# Patient Record
Sex: MALE | Race: WHITE | NOT HISPANIC OR LATINO | Employment: OTHER | URBAN - METROPOLITAN AREA
[De-identification: names, ages, dates, MRNs, and addresses within clinical notes are randomized per-mention and may not be internally consistent; named-entity substitution may affect disease eponyms.]

---

## 2018-10-21 ENCOUNTER — HOSPITAL ENCOUNTER (INPATIENT)
Facility: HOSPITAL | Age: 63
LOS: 2 days | Discharge: NURSING, REHAB OR LONG-TERM CARE | DRG: 178 | End: 2018-10-23
Attending: EMERGENCY MEDICINE | Admitting: HOSPITALIST
Payer: MEDICAID

## 2018-10-21 DIAGNOSIS — I10 ESSENTIAL HYPERTENSION: ICD-10-CM

## 2018-10-21 DIAGNOSIS — R50.9 FEVER, UNSPECIFIED FEVER CAUSE: Primary | ICD-10-CM

## 2018-10-21 DIAGNOSIS — R19.7 DIARRHEA, UNSPECIFIED TYPE: ICD-10-CM

## 2018-10-21 DIAGNOSIS — A41.9 SEPSIS, DUE TO UNSPECIFIED ORGANISM: ICD-10-CM

## 2018-10-21 DIAGNOSIS — R11.10 VOMITING: ICD-10-CM

## 2018-10-21 DIAGNOSIS — R00.0 TACHYCARDIA: ICD-10-CM

## 2018-10-21 DIAGNOSIS — J18.9 HCAP (HEALTHCARE-ASSOCIATED PNEUMONIA): ICD-10-CM

## 2018-10-21 DIAGNOSIS — E11.9 TYPE 2 DIABETES MELLITUS WITHOUT COMPLICATION, WITHOUT LONG-TERM CURRENT USE OF INSULIN: ICD-10-CM

## 2018-10-21 DIAGNOSIS — I69.30 HISTORY OF STROKE WITH RESIDUAL DEFICIT: ICD-10-CM

## 2018-10-21 DIAGNOSIS — R11.2 NON-INTRACTABLE VOMITING WITH NAUSEA, UNSPECIFIED VOMITING TYPE: ICD-10-CM

## 2018-10-21 DIAGNOSIS — J69.0 ASPIRATION PNEUMONITIS: ICD-10-CM

## 2018-10-21 DIAGNOSIS — R07.9 CHEST PAIN: ICD-10-CM

## 2018-10-21 PROBLEM — J30.2 SEASONAL ALLERGIES: Status: ACTIVE | Noted: 2018-10-21

## 2018-10-21 LAB
ALBUMIN SERPL BCP-MCNC: 4.3 G/DL
ALP SERPL-CCNC: 94 U/L
ALT SERPL W/O P-5'-P-CCNC: 24 U/L
ANION GAP SERPL CALC-SCNC: 11 MMOL/L
AST SERPL-CCNC: 29 U/L
BASOPHILS # BLD AUTO: 0.02 K/UL
BASOPHILS NFR BLD: 0.2 %
BILIRUB SERPL-MCNC: 0.8 MG/DL
BILIRUB UR QL STRIP: NEGATIVE
BUN SERPL-MCNC: 18 MG/DL
CALCIUM SERPL-MCNC: 10.9 MG/DL
CHLORIDE SERPL-SCNC: 107 MMOL/L
CLARITY UR REFRACT.AUTO: CLEAR
CO2 SERPL-SCNC: 22 MMOL/L
COLOR UR AUTO: YELLOW
CREAT SERPL-MCNC: 1.1 MG/DL
DIFFERENTIAL METHOD: ABNORMAL
EOSINOPHIL # BLD AUTO: 0 K/UL
EOSINOPHIL NFR BLD: 0.3 %
ERYTHROCYTE [DISTWIDTH] IN BLOOD BY AUTOMATED COUNT: 13.7 %
EST. GFR  (AFRICAN AMERICAN): >60 ML/MIN/1.73 M^2
EST. GFR  (NON AFRICAN AMERICAN): >60 ML/MIN/1.73 M^2
GLUCOSE SERPL-MCNC: 136 MG/DL
GLUCOSE UR QL STRIP: NEGATIVE
HCT VFR BLD AUTO: 45.6 %
HGB BLD-MCNC: 15.2 G/DL
HGB UR QL STRIP: NEGATIVE
IMM GRANULOCYTES # BLD AUTO: 0.04 K/UL
IMM GRANULOCYTES NFR BLD AUTO: 0.4 %
INR PPP: 1.2
KETONES UR QL STRIP: NEGATIVE
LACTATE SERPL-SCNC: 1.4 MMOL/L
LACTATE SERPL-SCNC: 1.9 MMOL/L
LEUKOCYTE ESTERASE UR QL STRIP: NEGATIVE
LIPASE SERPL-CCNC: 33 U/L
LYMPHOCYTES # BLD AUTO: 0.3 K/UL
LYMPHOCYTES NFR BLD: 2.9 %
MCH RBC QN AUTO: 30.2 PG
MCHC RBC AUTO-ENTMCNC: 33.3 G/DL
MCV RBC AUTO: 91 FL
MONOCYTES # BLD AUTO: 0.3 K/UL
MONOCYTES NFR BLD: 2.9 %
NEUTROPHILS # BLD AUTO: 9.8 K/UL
NEUTROPHILS NFR BLD: 93.3 %
NITRITE UR QL STRIP: NEGATIVE
NRBC BLD-RTO: 0 /100 WBC
PH UR STRIP: 5 [PH] (ref 5–8)
PLATELET # BLD AUTO: 141 K/UL
PMV BLD AUTO: 10.8 FL
POTASSIUM SERPL-SCNC: 4.3 MMOL/L
PROT SERPL-MCNC: 9.1 G/DL
PROT UR QL STRIP: NEGATIVE
PROTHROMBIN TIME: 11.9 SEC
RBC # BLD AUTO: 5.04 M/UL
SODIUM SERPL-SCNC: 140 MMOL/L
SP GR UR STRIP: 1.02 (ref 1–1.03)
TROPONIN I SERPL DL<=0.01 NG/ML-MCNC: 0.01 NG/ML
TROPONIN I SERPL DL<=0.01 NG/ML-MCNC: <0.006 NG/ML
URN SPEC COLLECT METH UR: NORMAL
UROBILINOGEN UR STRIP-ACNC: 2 EU/DL
WBC # BLD AUTO: 10.51 K/UL

## 2018-10-21 PROCEDURE — 25000003 PHARM REV CODE 250: Performed by: EMERGENCY MEDICINE

## 2018-10-21 PROCEDURE — 85025 COMPLETE CBC W/AUTO DIFF WBC: CPT

## 2018-10-21 PROCEDURE — 99223 1ST HOSP IP/OBS HIGH 75: CPT | Mod: ,,, | Performed by: HOSPITALIST

## 2018-10-21 PROCEDURE — 99285 EMERGENCY DEPT VISIT HI MDM: CPT | Mod: ,,, | Performed by: EMERGENCY MEDICINE

## 2018-10-21 PROCEDURE — 99285 EMERGENCY DEPT VISIT HI MDM: CPT | Mod: 25

## 2018-10-21 PROCEDURE — 84484 ASSAY OF TROPONIN QUANT: CPT

## 2018-10-21 PROCEDURE — 96366 THER/PROPH/DIAG IV INF ADDON: CPT

## 2018-10-21 PROCEDURE — 63600175 PHARM REV CODE 636 W HCPCS: Performed by: EMERGENCY MEDICINE

## 2018-10-21 PROCEDURE — 83605 ASSAY OF LACTIC ACID: CPT

## 2018-10-21 PROCEDURE — 83036 HEMOGLOBIN GLYCOSYLATED A1C: CPT

## 2018-10-21 PROCEDURE — 96365 THER/PROPH/DIAG IV INF INIT: CPT

## 2018-10-21 PROCEDURE — 96367 TX/PROPH/DG ADDL SEQ IV INF: CPT

## 2018-10-21 PROCEDURE — 93005 ELECTROCARDIOGRAM TRACING: CPT

## 2018-10-21 PROCEDURE — 12000002 HC ACUTE/MED SURGE SEMI-PRIVATE ROOM

## 2018-10-21 PROCEDURE — 25000003 PHARM REV CODE 250: Performed by: STUDENT IN AN ORGANIZED HEALTH CARE EDUCATION/TRAINING PROGRAM

## 2018-10-21 PROCEDURE — 83690 ASSAY OF LIPASE: CPT

## 2018-10-21 PROCEDURE — 84443 ASSAY THYROID STIM HORMONE: CPT

## 2018-10-21 PROCEDURE — 96375 TX/PRO/DX INJ NEW DRUG ADDON: CPT

## 2018-10-21 PROCEDURE — 87040 BLOOD CULTURE FOR BACTERIA: CPT

## 2018-10-21 PROCEDURE — 87449 NOS EACH ORGANISM AG IA: CPT

## 2018-10-21 PROCEDURE — 85610 PROTHROMBIN TIME: CPT

## 2018-10-21 PROCEDURE — 93010 ELECTROCARDIOGRAM REPORT: CPT | Mod: ,,, | Performed by: INTERNAL MEDICINE

## 2018-10-21 PROCEDURE — 81003 URINALYSIS AUTO W/O SCOPE: CPT

## 2018-10-21 PROCEDURE — 80053 COMPREHEN METABOLIC PANEL: CPT

## 2018-10-21 PROCEDURE — 25500020 PHARM REV CODE 255: Performed by: EMERGENCY MEDICINE

## 2018-10-21 RX ORDER — ATORVASTATIN CALCIUM 20 MG/1
40 TABLET, FILM COATED ORAL NIGHTLY
Status: DISCONTINUED | OUTPATIENT
Start: 2018-10-21 | End: 2018-10-23 | Stop reason: HOSPADM

## 2018-10-21 RX ORDER — TRAMADOL HYDROCHLORIDE 50 MG/1
50 TABLET ORAL EVERY 6 HOURS PRN
Status: ON HOLD | COMMUNITY
End: 2018-10-23 | Stop reason: HOSPADM

## 2018-10-21 RX ORDER — SODIUM CHLORIDE 0.9 % (FLUSH) 0.9 %
5 SYRINGE (ML) INJECTION
Status: DISCONTINUED | OUTPATIENT
Start: 2018-10-21 | End: 2018-10-23 | Stop reason: HOSPADM

## 2018-10-21 RX ORDER — GLUCAGON 1 MG
1 KIT INJECTION
Status: DISCONTINUED | OUTPATIENT
Start: 2018-10-21 | End: 2018-10-23 | Stop reason: HOSPADM

## 2018-10-21 RX ORDER — ASPIRIN 81 MG/1
81 TABLET ORAL DAILY
COMMUNITY

## 2018-10-21 RX ORDER — VANCOMYCIN HCL IN 5 % DEXTROSE 1.5G/250ML
1500 PLASTIC BAG, INJECTION (ML) INTRAVENOUS
Status: COMPLETED | OUTPATIENT
Start: 2018-10-21 | End: 2018-10-21

## 2018-10-21 RX ORDER — ACETAMINOPHEN 500 MG
1000 TABLET ORAL
Status: COMPLETED | OUTPATIENT
Start: 2018-10-21 | End: 2018-10-21

## 2018-10-21 RX ORDER — NAPROXEN SODIUM 220 MG/1
81 TABLET, FILM COATED ORAL DAILY
Status: DISCONTINUED | OUTPATIENT
Start: 2018-10-22 | End: 2018-10-23 | Stop reason: HOSPADM

## 2018-10-21 RX ORDER — LOSARTAN POTASSIUM 50 MG/1
50 TABLET ORAL DAILY
COMMUNITY

## 2018-10-21 RX ORDER — ONDANSETRON 2 MG/ML
4 INJECTION INTRAMUSCULAR; INTRAVENOUS EVERY 8 HOURS PRN
Status: DISCONTINUED | OUTPATIENT
Start: 2018-10-21 | End: 2018-10-23 | Stop reason: HOSPADM

## 2018-10-21 RX ORDER — FLUTICASONE PROPIONATE 50 MCG
1 SPRAY, SUSPENSION (ML) NASAL DAILY
Status: DISCONTINUED | OUTPATIENT
Start: 2018-10-22 | End: 2018-10-23 | Stop reason: HOSPADM

## 2018-10-21 RX ORDER — CETIRIZINE HYDROCHLORIDE 5 MG/1
10 TABLET ORAL DAILY
Status: DISCONTINUED | OUTPATIENT
Start: 2018-10-22 | End: 2018-10-23 | Stop reason: HOSPADM

## 2018-10-21 RX ORDER — FLUTICASONE PROPIONATE 50 MCG
1 SPRAY, SUSPENSION (ML) NASAL DAILY
COMMUNITY

## 2018-10-21 RX ORDER — IBUPROFEN 200 MG
16 TABLET ORAL
Status: DISCONTINUED | OUTPATIENT
Start: 2018-10-21 | End: 2018-10-23 | Stop reason: HOSPADM

## 2018-10-21 RX ORDER — IBUPROFEN 200 MG
24 TABLET ORAL
Status: DISCONTINUED | OUTPATIENT
Start: 2018-10-21 | End: 2018-10-23 | Stop reason: HOSPADM

## 2018-10-21 RX ORDER — LORATADINE 10 MG/1
10 TABLET ORAL DAILY
COMMUNITY

## 2018-10-21 RX ORDER — ATORVASTATIN CALCIUM 40 MG/1
40 TABLET, FILM COATED ORAL NIGHTLY
COMMUNITY

## 2018-10-21 RX ORDER — ACETAMINOPHEN 325 MG/1
650 TABLET ORAL EVERY 8 HOURS PRN
Status: DISCONTINUED | OUTPATIENT
Start: 2018-10-21 | End: 2018-10-23 | Stop reason: HOSPADM

## 2018-10-21 RX ORDER — ONDANSETRON 2 MG/ML
4 INJECTION INTRAMUSCULAR; INTRAVENOUS
Status: COMPLETED | OUTPATIENT
Start: 2018-10-21 | End: 2018-10-21

## 2018-10-21 RX ORDER — LOSARTAN POTASSIUM 25 MG/1
50 TABLET ORAL DAILY
Status: DISCONTINUED | OUTPATIENT
Start: 2018-10-22 | End: 2018-10-23 | Stop reason: HOSPADM

## 2018-10-21 RX ADMIN — ONDANSETRON 4 MG: 2 INJECTION INTRAMUSCULAR; INTRAVENOUS at 07:10

## 2018-10-21 RX ADMIN — ATORVASTATIN CALCIUM 40 MG: 20 TABLET, FILM COATED ORAL at 10:10

## 2018-10-21 RX ADMIN — ACETAMINOPHEN 1000 MG: 500 TABLET ORAL at 07:10

## 2018-10-21 RX ADMIN — SODIUM CHLORIDE 500 ML: 0.9 INJECTION, SOLUTION INTRAVENOUS at 06:10

## 2018-10-21 RX ADMIN — PIPERACILLIN AND TAZOBACTAM 4.5 G: 4; .5 INJECTION, POWDER, LYOPHILIZED, FOR SOLUTION INTRAVENOUS; PARENTERAL at 07:10

## 2018-10-21 RX ADMIN — SODIUM CHLORIDE 1000 ML: 0.9 INJECTION, SOLUTION INTRAVENOUS at 07:10

## 2018-10-21 RX ADMIN — VANCOMYCIN HYDROCHLORIDE 1500 MG: 10 INJECTION, POWDER, LYOPHILIZED, FOR SOLUTION INTRAVENOUS at 08:10

## 2018-10-21 RX ADMIN — IOHEXOL 100 ML: 350 INJECTION, SOLUTION INTRAVENOUS at 08:10

## 2018-10-21 NOTE — ED PROVIDER NOTES
Encounter Date: 10/21/2018    SCRIBE #1 NOTE: I, Flavia Lane, am scribing for, and in the presence of,  Dr. Tracy. I have scribed the entire note.       History     Chief Complaint   Patient presents with    Diarrhea     diarrhea that began yesterday; also tachycardic today     Emesis    Nausea     This is a 63 y.o. male with medical history of HTN, stroke, and HLD presenting to the ED with complaint of diarrhea and vomiting that began earlier today. Patient states he has had x2 episodes of diarrhea and  x5 episodes of emesis that began around lunch time. Patient also reports a productive cough with yellow sputum x couple days and SOB after episodes of emesis. Patient notes associated chills. Denies CP, bloody emesis, abdominal pain, and fever. Denies taking any antibiotics. Patient reports eating corn flakes for breakfast. Patient reports previous stroke with chronic and unchanging weakness on the left side. In ED, patient reports feeling nauseous.      The history is provided by the patient.     Review of patient's allergies indicates:  No Known Allergies  Past Medical History:   Diagnosis Date    Hyperlipidemia     Hypertension     Stroke      Past Surgical History:   Procedure Laterality Date    ABDOMINAL SURGERY N/A     Piece of metal lodged in abdomen - workplace injury, surgical removal    CHOLECYSTECTOMY      STOMACH SURGERY       Family History   Problem Relation Age of Onset    No Known Problems Mother     Diabetes Father      Social History     Tobacco Use    Smoking status: Former Smoker    Smokeless tobacco: Current User     Types: Snuff    Tobacco comment: Quit 2 months ago   Substance Use Topics    Alcohol use: No     Frequency: Never     Comment: Last drink 4 years ago    Drug use: No     Review of Systems   Constitutional: Positive for chills. Negative for fever.   HENT: Negative for sore throat.    Respiratory: Positive for cough and shortness of breath.     Cardiovascular: Negative for chest pain.   Gastrointestinal: Positive for diarrhea, nausea and vomiting. Negative for abdominal pain.   Genitourinary: Negative for dysuria.   Musculoskeletal: Negative for back pain.   Skin: Negative for rash.   Neurological: Negative for weakness.   All other systems reviewed and are negative.      Physical Exam     Initial Vitals [10/21/18 1739]   BP Pulse Resp Temp SpO2   (!) 148/90 (!) 138 18 99.9 °F (37.7 °C) 97 %      MAP       --         Physical Exam    Nursing note and vitals reviewed.  Constitutional:   DMM. Weak on left side from prior stroke x2 years ago. Chronic and unchanged.   HENT:   Head: Normocephalic and atraumatic.   Eyes: EOM are normal. Pupils are equal, round, and reactive to light.   Neck: Normal range of motion. Neck supple. No tracheal deviation present. No JVD present.   Cardiovascular:   Tachycardic.   Pulmonary/Chest: Breath sounds normal. No respiratory distress. He has no wheezes. He has no rales.   Abdominal: Soft. Bowel sounds are normal. He exhibits no distension. There is no tenderness.   Musculoskeletal: Normal range of motion.   Neurological: He is alert and oriented to person, place, and time. He has normal strength.   Skin: Skin is warm and dry.   Psychiatric: He has a normal mood and affect. His behavior is normal. Thought content normal.         ED Course   Procedures  Labs Reviewed   CBC W/ AUTO DIFFERENTIAL - Abnormal; Notable for the following components:       Result Value    Platelets 141 (*)     Gran # (ANC) 9.8 (*)     Lymph # 0.3 (*)     Gran% 93.3 (*)     Lymph% 2.9 (*)     Mono% 2.9 (*)     All other components within normal limits   COMPREHENSIVE METABOLIC PANEL - Abnormal; Notable for the following components:    CO2 22 (*)     Glucose 136 (*)     Calcium 10.9 (*)     Total Protein 9.1 (*)     All other components within normal limits   TROPONIN I   TROPONIN I   PROTIME-INR   URINALYSIS, REFLEX TO URINE CULTURE    Narrative:      Preferred Collection Type->Urine, Clean Catch   LIPASE   LACTIC ACID, PLASMA   LACTIC ACID, PLASMA   TSH   LEGIONELLA ANTIGEN, URINE RANDOM     EKG Readings: (Independently Interpreted)   Initial Reading: No STEMI. Rhythm: Normal Sinus Rhythm. Heart Rate: 121. Conduction: LAFP.       Imaging Results          CT Abdomen Pelvis With Contrast (Final result)  Result time 10/21/18 20:43:49    Final result by Flip Knight MD (10/21/18 20:43:49)                 Impression:      1. Bilateral basilar subsegmental atelectasis involving the lower lobes, some of which, on the right, appears more confluency.  Developing infectious process not excluded.  Scattered tree-in-bud nodular opacity noted within the right lower lobe, could reflect edema or infectious/non infectious pneumonitis.  Correlation is advised.  2. Apparent secretions within the right lower lobe bronchus, places patient at risk for aspiration.  3. Fluid-filled proximal small bowel loops, nonspecific, no secondary findings to suggest karen enteritis.  4. Prostatomegaly.  5. High attenuating focus within the urinary bladder, could reflect early excretion of contrast versus nonspecific calcification.  6. Several additional findings above.      Electronically signed by: Flip Knight MD  Date:    10/21/2018  Time:    20:43             Narrative:    EXAMINATION:  CT ABDOMEN PELVIS WITH CONTRAST    CLINICAL HISTORY:  Nausea, vomiting, diarrhea;    TECHNIQUE:  Low dose axial images, sagittal and coronal reformations were obtained from the lung bases to the pubic symphysis following the IV administration of 100 mL of Omnipaque 350 .  Oral contrast was not given.    COMPARISON:  None.    FINDINGS:  Images of the lower thorax are remarkable for bilateral dependent atelectasis.  Some of which appears more confluent, developing infectious process is not excluded.  There is scattered tree-in-bud type nodularity within the right upper lobe, nonspecific, infectious or  inflammatory etiologies as well as edema can present in a similar fashion.  There is calcification in the distribution of the coronary arteries.  There are aerated secretions within the left lower lobe bronchus.    The liver, spleen, and pancreas are unremarkable.  The gallbladder is surgically absent.  The common duct is mildly prominent, likely on the basis of cholecystectomy.  The pancreatic duct is not dilated.  There is a right adrenal nodule measuring 3.2 cm, and a left adrenal nodule measuring 1.9 cm, both of which nonspecific in attenuation.  There is some fatty change of the pancreas.  The portal vein, splenic vein, SMV, celiac axis and SMA all are patent.  No significant abdominal lymphadenopathy.    The kidneys enhance symmetrically and excrete contrast appropriately without hydronephrosis or nephrolithiasis.  The bilateral ureters are unremarkable without calculi seen.  The urinary bladder is remarkable for a small focus of high attenuation within the urinary bladder on the initial images, may reflect sequela of early contrast excretion versus calcification of the wall.  The urinary bladder is otherwise grossly unremarkable.  The prostate is enlarged, encroaching upon the posterior bladder wall, the prostate measures approximately 5.9 x 3.9 cm.    The large bowel is grossly unremarkable.  The terminal ileum is unremarkable.  The appendix is unremarkable.  There are scattered fluid-filled small bowel loops, nondilated.  Postsurgical change is noted within the anterior aspect of the abdomen.  No focal organized pelvic fluid collection.    Degenerative changes are noted of the spine without focal destructive process.  There is atherosclerotic calcification of the aorta and its branches.  No significant inguinal lymphadenopathy.  There are fat containing inguinal hernias without inflammation.                               X-Ray Chest AP Portable (Final result)  Result time 10/21/18 19:03:45    Final result  by Flip Knight MD (10/21/18 19:03:45)                 Impression:      1. No acute cardiopulmonary process.      Electronically signed by: Flip Knight MD  Date:    10/21/2018  Time:    19:03             Narrative:    EXAMINATION:  XR CHEST AP PORTABLE    CLINICAL HISTORY:  Chest Pain;    TECHNIQUE:  Single frontal view of the chest was performed.    COMPARISON:  None    FINDINGS:  Single-view.    The cardiomediastinal silhouette is not enlarged, noting calcification of the aorta..  There is no pleural effusion.  The trachea is midline.  The lungs are symmetrically expanded bilaterally without evidence of acute parenchymal process.  There is minimal right basilar subsegmental atelectasis.  No large focal consolidation seen.  There is no pneumothorax.  The osseous structures are remarkable for degenerative changes..                               X-Ray Abdomen AP 1 View (KUB) (Final result)  Result time 10/21/18 19:04:48    Final result by Flip Knight MD (10/21/18 19:04:48)                 Impression:      1. Overall, nonspecific bowel gas pattern noting a mildly prominent small bowel loop, air-filled, projects over the left upper quadrant.  Upright and supine imaging as warranted.      Electronically signed by: Flip Knight MD  Date:    10/21/2018  Time:    19:04             Narrative:    EXAMINATION:  XR ABDOMEN AP 1 VIEW    CLINICAL HISTORY:  Vomiting, unspecified    TECHNIQUE:  AP View(s) of the abdomen was performed.    COMPARISON:  None    FINDINGS:  Single-view abdomen supine.    There are a few gas-filled small bowel loops, some of which mildly prominent, within the left upper quadrant.  Air and stool is seen within the large bowel and projected over the rectum.  There are no calcifications to convincingly suggest nephrolithiasis or cholelithiasis.  The osseous structures are grossly intact.  No findings to suggest pneumatosis.                                 Medical Decision Making:    History:   Old Medical Records: I decided to obtain old medical records.  Old Records Summarized: records from clinic visits and records from previous admission(s).  Initial Assessment:   62 yo male presents with diarrhea x2 episodes and emesis x5 episodes. Reports productive cough with yellow sputum x a few days and SOB after emesis episodes. Tachycardiac. Reports chills and nausea. Denies CP, fever, bloody emesis, or abdominal pain. Not taking antibiotics.  Clinical Tests:   Lab Tests: Ordered and Reviewed  Radiological Study: Ordered and Reviewed  Medical Tests: Ordered and Reviewed  ED Management:  Concern for sepsis given his high core temperature, tachycardia, and hx of productive cough. Will provide fluids and pursue sepsis work-up. Will provide empiric antibiotics given concern for sepsis and that he is from a nursing home.              Attending Attestation:           Physician Attestation for Scribe:  Physician Attestation Statement for Scribe #1: I, Flavia Lane, reviewed documentation, as scribed by Dr. Tracy in my presence, and it is both accurate and complete.         Attending ED Notes:   8:00 PM  Prelim labs and imaging reviewed. Abnormal abd xr noted, given age, hx, risk factors and acute n/v, will obtain CT abd/pelvis    9:05 PM  Ct concerning for likely developing PNA, hcap vs aspiration, has already been given broad spectrum abx. Vitals significantly improved from arrival. D/w , meets inpatient criteria, spoke with Dr Lane who will be admitting to service of Dr Robertson. Pt updated and agreeable with the POC.             Clinical Impression:     1. Fever, unspecified fever cause    2. Chest pain    3. Vomiting    4. Tachycardia    5. Sepsis, due to unspecified organism    6. HCAP (healthcare-associated pneumonia)    7. Aspiration pneumonitis    8. Non-intractable vomiting with nausea, unspecified vomiting type    9. History of stroke with residual deficit    10. Diarrhea,  unspecified type    11. Essential hypertension    12. Type 2 diabetes mellitus without complication, without long-term current use of insulin            Disposition:   Disposition: Admitted  Condition: Stable                        Marcus Tracy MD  10/25/18 2424

## 2018-10-21 NOTE — ED TRIAGE NOTES
Patient arrives to ED via EMS transfer from [facility] with CC of nausea, vomiting, diarrhea and SOB, onset today. Patient denies chest pain and fever.     Patient identifiers verified and correct for Neri Regan.    LOC: The patient is awake, alert and oriented x 4. Pt is speaking appropriately, no slurred speech.  APPEARANCE: Patient resting comfortably and in no acute distress. Pt is clean and well groomed. No JVD visible. Pt reports pain level of 0.  SKIN: Skin is warm, dry, and color is consistent with ethnicity. No tenting observed and capillary refill <3 seconds.   RESPIRATORY: Airway is open and patent. Respirations-unlabored, regular rate, equal bilaterally on inspiration and expiration. No accessory muscle use noted.   CARDIAC: No peripheral edema noted, and patient has no c/o chest pain.  ABDOMEN: Soft and non-tender to palpation with no distention noted. Patient reports normal appetite.   : No complaints of frequency, burning, urgency or blood in the urine.

## 2018-10-22 LAB
ANION GAP SERPL CALC-SCNC: 7 MMOL/L
BASOPHILS # BLD AUTO: 0.02 K/UL
BASOPHILS NFR BLD: 0.2 %
BUN SERPL-MCNC: 17 MG/DL
CALCIUM SERPL-MCNC: 9.6 MG/DL
CHLORIDE SERPL-SCNC: 111 MMOL/L
CO2 SERPL-SCNC: 21 MMOL/L
CREAT SERPL-MCNC: 0.9 MG/DL
DIFFERENTIAL METHOD: ABNORMAL
EOSINOPHIL # BLD AUTO: 0.2 K/UL
EOSINOPHIL NFR BLD: 1.8 %
ERYTHROCYTE [DISTWIDTH] IN BLOOD BY AUTOMATED COUNT: 13.7 %
EST. GFR  (AFRICAN AMERICAN): >60 ML/MIN/1.73 M^2
EST. GFR  (NON AFRICAN AMERICAN): >60 ML/MIN/1.73 M^2
ESTIMATED AVG GLUCOSE: 105 MG/DL
GLUCOSE SERPL-MCNC: 109 MG/DL
HBA1C MFR BLD HPLC: 5.3 %
HCT VFR BLD AUTO: 37 %
HGB BLD-MCNC: 12.3 G/DL
IMM GRANULOCYTES # BLD AUTO: 0.03 K/UL
IMM GRANULOCYTES NFR BLD AUTO: 0.4 %
LYMPHOCYTES # BLD AUTO: 1.1 K/UL
LYMPHOCYTES NFR BLD: 12.5 %
MAGNESIUM SERPL-MCNC: 1.8 MG/DL
MCH RBC QN AUTO: 30.8 PG
MCHC RBC AUTO-ENTMCNC: 33.2 G/DL
MCV RBC AUTO: 93 FL
MONOCYTES # BLD AUTO: 0.5 K/UL
MONOCYTES NFR BLD: 5.3 %
NEUTROPHILS # BLD AUTO: 6.8 K/UL
NEUTROPHILS NFR BLD: 79.8 %
NRBC BLD-RTO: 0 /100 WBC
PHOSPHATE SERPL-MCNC: 2.7 MG/DL
PLATELET # BLD AUTO: 131 K/UL
PMV BLD AUTO: 10.2 FL
POCT GLUCOSE: 81 MG/DL (ref 70–110)
POTASSIUM SERPL-SCNC: 3.6 MMOL/L
RBC # BLD AUTO: 4 M/UL
SODIUM SERPL-SCNC: 139 MMOL/L
TSH SERPL DL<=0.005 MIU/L-ACNC: 0.42 UIU/ML
WBC # BLD AUTO: 8.56 K/UL

## 2018-10-22 PROCEDURE — 63600175 PHARM REV CODE 636 W HCPCS: Performed by: STUDENT IN AN ORGANIZED HEALTH CARE EDUCATION/TRAINING PROGRAM

## 2018-10-22 PROCEDURE — 25000003 PHARM REV CODE 250: Performed by: STUDENT IN AN ORGANIZED HEALTH CARE EDUCATION/TRAINING PROGRAM

## 2018-10-22 PROCEDURE — 36415 COLL VENOUS BLD VENIPUNCTURE: CPT

## 2018-10-22 PROCEDURE — 80048 BASIC METABOLIC PNL TOTAL CA: CPT

## 2018-10-22 PROCEDURE — 85025 COMPLETE CBC W/AUTO DIFF WBC: CPT

## 2018-10-22 PROCEDURE — 99232 SBSQ HOSP IP/OBS MODERATE 35: CPT | Mod: ,,, | Performed by: HOSPITALIST

## 2018-10-22 PROCEDURE — 84100 ASSAY OF PHOSPHORUS: CPT

## 2018-10-22 PROCEDURE — 11000001 HC ACUTE MED/SURG PRIVATE ROOM

## 2018-10-22 PROCEDURE — 97166 OT EVAL MOD COMPLEX 45 MIN: CPT

## 2018-10-22 PROCEDURE — 83735 ASSAY OF MAGNESIUM: CPT

## 2018-10-22 RX ORDER — ACETAMINOPHEN 500 MG
1000 TABLET ORAL EVERY 8 HOURS PRN
COMMUNITY

## 2018-10-22 RX ORDER — IBUPROFEN 400 MG/1
400 TABLET ORAL DAILY PRN
COMMUNITY

## 2018-10-22 RX ORDER — NYSTATIN 100000 U/G
CREAM TOPICAL
COMMUNITY

## 2018-10-22 RX ORDER — PREDNISONE 20 MG/1
40 TABLET ORAL DAILY
Status: DISCONTINUED | OUTPATIENT
Start: 2018-10-22 | End: 2018-10-23 | Stop reason: HOSPADM

## 2018-10-22 RX ORDER — HYDROCORTISONE 1 %
CREAM (GRAM) TOPICAL
COMMUNITY

## 2018-10-22 RX ORDER — POTASSIUM CHLORIDE 20 MEQ/1
40 TABLET, EXTENDED RELEASE ORAL ONCE
Status: COMPLETED | OUTPATIENT
Start: 2018-10-22 | End: 2018-10-22

## 2018-10-22 RX ORDER — GUAIFENESIN 100 MG/5ML
200 SOLUTION ORAL EVERY 6 HOURS PRN
COMMUNITY

## 2018-10-22 RX ORDER — ENOXAPARIN SODIUM 100 MG/ML
40 INJECTION SUBCUTANEOUS EVERY 24 HOURS
Status: DISCONTINUED | OUTPATIENT
Start: 2018-10-22 | End: 2018-10-23 | Stop reason: HOSPADM

## 2018-10-22 RX ADMIN — AMPICILLIN AND SULBACTAM 1.5 G: 1; .5 INJECTION, POWDER, FOR SOLUTION INTRAVENOUS at 01:10

## 2018-10-22 RX ADMIN — CETIRIZINE HYDROCHLORIDE 10 MG: 5 TABLET ORAL at 09:10

## 2018-10-22 RX ADMIN — POTASSIUM CHLORIDE 40 MEQ: 1500 TABLET, EXTENDED RELEASE ORAL at 12:10

## 2018-10-22 RX ADMIN — PREDNISONE 40 MG: 20 TABLET ORAL at 12:10

## 2018-10-22 RX ADMIN — ENOXAPARIN SODIUM 40 MG: 100 INJECTION SUBCUTANEOUS at 04:10

## 2018-10-22 RX ADMIN — AMPICILLIN AND SULBACTAM 1.5 G: 1; .5 INJECTION, POWDER, FOR SOLUTION INTRAVENOUS at 08:10

## 2018-10-22 RX ADMIN — ACETAMINOPHEN 650 MG: 325 TABLET ORAL at 09:10

## 2018-10-22 RX ADMIN — LOSARTAN POTASSIUM 50 MG: 25 TABLET, FILM COATED ORAL at 09:10

## 2018-10-22 RX ADMIN — ACETAMINOPHEN 650 MG: 325 TABLET ORAL at 04:10

## 2018-10-22 RX ADMIN — ATORVASTATIN CALCIUM 40 MG: 20 TABLET, FILM COATED ORAL at 08:10

## 2018-10-22 RX ADMIN — AMPICILLIN AND SULBACTAM 1.5 G: 1; .5 INJECTION, POWDER, FOR SOLUTION INTRAVENOUS at 09:10

## 2018-10-22 RX ADMIN — ASPIRIN 81 MG CHEWABLE TABLET 81 MG: 81 TABLET CHEWABLE at 09:10

## 2018-10-22 RX ADMIN — SODIUM CHLORIDE 500 ML: 0.9 INJECTION, SOLUTION INTRAVENOUS at 04:10

## 2018-10-22 NOTE — ASSESSMENT & PLAN NOTE
"Recent episode of n/v with 3x successive episodes of emesis followed by fever, CT A/P showed "bilateral bibasilar subsegmental atelectasis involving the lower lobes, some of which, on the right, appears more confluency. Developing infectious processes not excluded. Scattered tree-in-bud nodular opacity noted within the right lower lobe, could reflect edema or infectious/non infectious pneumonitis. Given vanc + zosyn x1 dose in ED + 1500 cc    - Continue on Unasyn 1.5g Q6H for aspiration pneumonia  -NPO now due to concern for aspiration pending SLP evaluation   - Bcx and sputum culture pending  "

## 2018-10-22 NOTE — HPI
63M with hx stroke (06/2017, residual L sided weakness), HTN, HLD presents to ED from [facility] after having an episode of emesis followed by vomiting. Pt says that earlier today he had a bite of steak for dinner and immediately became nauseated and vomited. He says that as soon as the food was in his mouth he became nauseous; denies having recent sensation of food getting stuck or regurgitating partially digested food. Pt says that he vomited x3 in rapid succession 2/2 nausea. He says vomit was non-bloody and non-bilious. Shortly thereafter he had 4 lose BMs which he describes as watery, greasy, non-bloody, denies black/dark appearance. He says that afterward he was helped to his bed and had a few sips of water, which made him feel better. Pt's says his PCP was called and suggested that he go to the ED. Pt also mentions that he has had a cough productive of yellow sputum for the last 3 days; denies fever, chills, body aches, weakness, dyspnea, chest pain, abdominal pain, or hemoptysis.    In the ED pt had an initial HR of 138, developed a temperature of 103.4F. WBC was 10.5K, lactate 1.4, CXR without obvious consolidation but CT A/P showed findings compatible with developing pneumonitis in RLL. Pt was given 1500 CC NS, 1500 mg vancomycin x1, 4.5 g piperacillin-tazobactam x1, and admitted to Hospital Medicine

## 2018-10-22 NOTE — H&P
[facility] Hospital Medicine  History & Physical    Patient Name: Neri Regan  MRN: [id number]  Admission Date: 10/21/2018  Attending Physician: Owen Robertson MD   Primary Care Provider: Enrique Dubois MD    [facility] Medicine Team: [facility] MED 4 Huey Cooley MD     Patient information was obtained from patient, nursing home, past medical records and ER records.     Subjective:     Principal Problem:Aspiration pneumonia    Chief Complaint:   Chief Complaint   Patient presents with    Diarrhea     diarrhea that began yesterday; also tachycardic today     Emesis    Nausea        HPI: 63M with hx stroke (06/2017, residual L sided weakness), HTN, HLD presents to ED from [facility] after having an episode of emesis followed by vomiting. Pt says that earlier today he had a bite of steak for dinner and immediately became nauseated and vomited. He says that as soon as the food was in his mouth he became nauseous; denies having recent sensation of food getting stuck or regurgitating partially digested food. Pt says that he vomited x3 in rapid succession 2/2 nausea. He says vomit was non-bloody and non-bilious. Shortly thereafter he had 4 lose BMs which he describes as watery, greasy, non-bloody, denies black/dark appearance. He says that afterward he was helped to his bed and had a few sips of water, which made him feel better. Pt's says his PCP was called and suggested that he go to the ED. Pt also mentions that he has had a cough productive of yellow sputum for the last 3 days; denies fever, chills, body aches, weakness, dyspnea, chest pain, abdominal pain, or hemoptysis.    In the ED pt had an initial HR of 138, developed a temperature of 103.4F. WBC was 10.5K, lactate 1.4, CXR without obvious consolidation but CT A/P showed findings compatible with developing pneumonitis in RLL. Pt was given 1500 CC NS, 1500 mg vancomycin x1, 4.5 g piperacillin-tazobactam x1, and admitted to  "Hospital Medicine    Past Medical History:   Diagnosis Date    Hyperlipidemia     Hypertension     Stroke        Past Surgical History:   Procedure Laterality Date    ABDOMINAL SURGERY N/A     Piece of metal lodged in abdomen - workplace injury, surgical removal    CHOLECYSTECTOMY      STOMACH SURGERY         Review of patient's allergies indicates:  No Known Allergies    No current facility-administered medications on file prior to encounter.      Current Outpatient Medications on File Prior to Encounter   Medication Sig    atorvastatin (LIPITOR) 40 MG tablet Take 40 mg by mouth every evening.    fluticasone (FLONASE) 50 mcg/actuation nasal spray 1 spray by Each Nare route once daily.    loratadine (CLARITIN) 10 mg tablet Take 10 mg by mouth once daily.    losartan (COZAAR) 50 MG tablet Take 50 mg by mouth once daily.    traMADol (ULTRAM) 50 mg tablet Take 50 mg by mouth every 8 (eight) hours as needed for Pain.     Family History     Family history is unknown by patient.        Tobacco Use    Smoking status: Former Smoker    Smokeless tobacco: Current User     Types: Snuff    Tobacco comment: Quit 2 months ago   Substance and Sexual Activity    Alcohol use: No     Frequency: Never     Comment: Last drink 4 years ago    Drug use: No    Sexual activity: Not on file     Review of Systems   Constitutional: Negative for chills, fatigue and fever.        Pt says "I feel pretty good"   Respiratory: Positive for cough. Negative for apnea, choking, chest tightness, shortness of breath, wheezing and stridor.    Cardiovascular: Negative for chest pain, palpitations and leg swelling.   Gastrointestinal: Positive for diarrhea, nausea and vomiting. Negative for abdominal distention and abdominal pain.   Genitourinary: Negative for decreased urine volume, dysuria, frequency, hematuria and urgency.   Musculoskeletal: Negative for arthralgias, back pain, myalgias, neck pain and neck stiffness.   Neurological: " Positive for facial asymmetry. Negative for dizziness, tremors, speech difficulty and light-headedness.     Objective:     Vital Signs (Most Recent):  Temp: (!) 101.2 °F (38.4 °C) (10/21/18 2050)  Pulse: 87 (10/21/18 2050)  Resp: 16 (10/21/18 2050)  BP: 116/75 (10/21/18 2050)  SpO2: 95 % (10/21/18 2050) Vital Signs (24h Range):  Temp:  [99.9 °F (37.7 °C)-103.4 °F (39.7 °C)] 101.2 °F (38.4 °C)  Pulse:  [] 87  Resp:  [16-18] 16  SpO2:  [95 %-97 %] 95 %  BP: (116-148)/(75-90) 116/75     Weight: 90.7 kg (200 lb)  Body mass index is 26.39 kg/m².    Physical Exam   Constitutional: He is oriented to person, place, and time. He appears well-developed and well-nourished. No distress.   HENT:   Right Ear: External ear normal.   Left Ear: External ear normal.   Mouth/Throat: Oropharynx is clear and moist.   Asymmetric facies with L facial droop - pt reports this is chronic   Eyes: EOM are normal. Pupils are equal, round, and reactive to light. Right eye exhibits no discharge. Left eye exhibits no discharge. No scleral icterus.   Neck: Normal range of motion. Neck supple. No JVD present. No tracheal deviation present.   Cardiovascular: Normal rate, regular rhythm and intact distal pulses.   No murmur heard.  Pulmonary/Chest: Effort normal.   R posterior basilar breath sounds decreased compared to left. No wheezes, rales, rhonchi.   Abdominal: Soft. Bowel sounds are normal. He exhibits no distension. There is no tenderness.   Firm, non-rigid. RUQ cholecystectomy scar and midline laparotomy scar noted, well-healed.   Musculoskeletal: Normal range of motion. He exhibits no edema.   Neurological: He is alert and oriented to person, place, and time. He has normal strength.   Skin: Skin is warm. Capillary refill takes less than 2 seconds. He is not diaphoretic. No pallor.         CRANIAL NERVES     CN III, IV, VI   Pupils are equal, round, and reactive to light.  Extraocular motions are normal.        Significant Labs:   CBC:  "  Recent Labs   Lab 10/21/18  1849   WBC 10.51   HGB 15.2   HCT 45.6   *     CMP:   Recent Labs   Lab 10/21/18  1849      K 4.3      CO2 22*   *   BUN 18   CREATININE 1.1   CALCIUM 10.9*   PROT 9.1*   ALBUMIN 4.3   BILITOT 0.8   ALKPHOS 94   AST 29   ALT 24   ANIONGAP 11   EGFRNONAA >60.0     Lactic Acid:   Recent Labs   Lab 10/21/18  1940   LACTATE 1.4     Troponin:   Recent Labs   Lab 10/21/18  1849 10/21/18  2114   TROPONINI <0.006 0.013     Urine Studies:   Recent Labs   Lab 10/21/18  1912   COLORU Yellow   APPEARANCEUA Clear   PHUR 5.0   SPECGRAV 1.025   PROTEINUA Negative   GLUCUA Negative   KETONESU Negative   BILIRUBINUA Negative   OCCULTUA Negative   NITRITE Negative   UROBILINOGEN 2.0   LEUKOCYTESUR Negative       Significant Imaging: I have reviewed all pertinent imaging results/findings within the past 24 hours.    Assessment/Plan:     * Aspiration pneumonia    Recent episode of n/v with 3x successive episodes of emesis followed by fever, CT A/P showed "bilateral bibasilar subsegmental atelectasis involving the lower lobes, some of which, on the right, appears more confluency. Developing infectious processes not excluded. Scattered tree-in-bud nodular opacity noted within the right lower lobe, could reflect edema or infectious/non infectious pneumonitis.  - Given vanc + zosyn x1 dose in ED + 1500 cc  - Pt without signs of end-organ damage (i.e. no LEROY, encephalopathy, transaminitis) to suggest sepsis; however pt with 3 days of productive cough preceding possible aspiration.  -- Will continue on Unasyn 1.5g Q6H for aspiration pneumonia, may consider converting to Augmentin once pt seen by SLP and cleared for diet  - Blood cultures done, sputum cultured ordered     Diarrhea    Pt says he had 4x loose, watery bowel movements that were greasy in nature. No blood or dark, tarry quality, no hx clostridium difficile or recent antibiotic use  - C diff EIA ordered       Non-intractable " vomiting with nausea    Pt says that he felt a sensation similar to his previous episodes of reflux - he says that he has had this nausea/vomiting in the past when he ate spicy food, as he did the night of admission.  - PRN zofran     Essential hypertension    Home meds: Losartan 50 mg daily  - continued       Seasonal allergies    Home meds: fluticasone, cetirizine 10 mg daily  - Continued       History of stroke with residual deficit    Home meds: Atorvastatin 40 mg qHS, ASA 81 mg daily  -Continued  - PT/OT/SLP ordered     Type 2 diabetes mellitus, without long-term current use of insulin    Pt says that his diabetes is diet-controlled  - A1c ordered; if pt has significantly high AM glucose or high A1c may consider accuchecks       VTE Risk Mitigation (From admission, onward)        Ordered     Place WILBER hose  Until discontinued      10/21/18 2108     Place sequential compression device  Until discontinued      10/21/18 2108             Huey Cooley MD  Department of Hospital Medicine   [facility]

## 2018-10-22 NOTE — PT/OT/SLP EVAL
Occupational Therapy   Evaluation    Name: Neri Regan  MRN: [id number]  Admitting Diagnosis:  Aspiration pneumonia      Recommendations:     Discharge Recommendations: nursing facility, skilled  Discharge Equipment Recommendations:  (tbd)  Barriers to discharge:  None    History:     Occupational Profile:  Living Environment: Pt admitted from [facility] where he states he was receiving daily therapy.   Previous level of function: Assist w/ ADLs and mobility  Roles and Routines: N/A  Equipment Used at Home:  none  Assistance upon Discharge: Pt has NH assistance upon D/C.     Past Medical History:   Diagnosis Date    Hyperlipidemia     Hypertension     Stroke        Past Surgical History:   Procedure Laterality Date    ABDOMINAL SURGERY N/A     Piece of metal lodged in abdomen - workplace injury, surgical removal    CHOLECYSTECTOMY      STOMACH SURGERY         Subjective     Chief Complaint: No complaints   Patient/Family Comments/goals: Improve overall functioning.     Pain/Comfort:  · Pain Rating 1: 0/10  · Pain Rating Post-Intervention 1: 0/10    Patients cultural, spiritual, Faith conflicts given the current situation:      Objective:     Communicated with: RN prior to session.  Patient found call button in reach and   upon OT entry to room.    General Precautions: Standard, fall   Orthopedic Precautions:N/A   Braces: N/A     Occupational Performance:    Bed Mobility:    · Patient completed Scooting/Bridging with minimum assistance  · Patient completed Supine to Sit with moderate assistance  · Patient completed Sit to Supine with stand by assistance    Functional Mobility/Transfers:  · Patient completed Sit <> Stand Transfer with moderate assistance  with  no assistive device   · Functional Mobility: Pt took 3 side steps to HOB at mod to max a w/o AD.     Activities of Daily Living:  · Upper Body Dressing: moderate assistance donned gown as chris    Cognitive/Visual  Perceptual:  Cognitive/Psychosocial Skills:     -       Oriented to: Person, Place, Time and Situation   -       Follows Commands/attention:Follows multistep  commands  -       Communication: clear/fluent  -       Memory: No Deficits noted  -       Safety awareness/insight to disability: intact   -       Mood/Affect/Coping skills/emotional control: Appropriate to situation  Visual/Perceptual:      -Intact      Physical Exam:  Balance:    -       Pt displayed good sitting balance and poor standing balance   Postural examination/scapula alignment:    -       Rounded shoulders  Skin integrity: Visible skin intact  Upper Extremity Range of Motion:     -       Right Upper Extremity: WFL    -       Left Upper Extremity: Deficits: 10 degrees AROM of shoulder flx, ~110 degrees of elbow flx for PROM and minimal AROM for elbow flx      Upper Extremity Strength:    -       Right Upper Extremity: WFL     Strength:    -       Right Upper Extremity: WFL    Fine Motor Coordination:    -       Impaired  Right hand, finger to nose  , Left hand thumb/finger opposition skills   and Left hand, manipulation of objects    Gross motor coordination: L hemiplegia/paresis    AMPAC 6 Click ADL:  AMPAC Total Score: 14    Treatment & Education:  Pt educated on POC.   Education:    Patient left HOB elevated with call button in reach and RN notified    Assessment:     Neri Regan is a 63 y.o. male with a medical diagnosis of Aspiration pneumonia.  He presents with the following performance deficits affecting function: weakness, impaired endurance, impaired self care skills, impaired functional mobilty, gait instability, impaired balance, decreased upper extremity function, decreased lower extremity function, decreased ROM, decreased safety awareness.      Rehab Prognosis: Fair; patient would benefit from acute skilled OT services to address these deficits and reach maximum level of function.         Clinical Decision Makin.  OT Mod:   ""Pt evaluation falls under moderate complexity for evaluation coding due to identification of 3-5 performance deficits noted as stated above. Eval required Min/Mod assistance to complete on this date and detailed assessment(s) were utilized. Moreover, an expanded review of history and occupational profile obtained with additional review of cognitive, physical and psychosocial hx."     Plan:     Patient to be seen 3 x/week to address the above listed problems via self-care/home management, therapeutic activities, therapeutic exercises, neuromuscular re-education  · Plan of Care Expires: 11/22/18  · Plan of Care Reviewed with: patient    This Plan of care has been discussed with the patient who was involved in its development and understands and is in agreement with the identified goals and treatment plan    GOALS:   Multidisciplinary Problems     Occupational Therapy Goals        Problem: Occupational Therapy Goal    Goal Priority Disciplines Outcome Interventions   Occupational Therapy Goal     OT, PT/OT     Description:  Goals to be met by: 11/5/2018     Patient will increase functional independence with ADLs by performing:    UE Dressing with Minimal Assistance.  LE Dressing with Minimal Assistance.  Grooming while standing with Minimal Assistance.  Toileting from bedside commode with Minimal Assistance for hygiene and clothing management.   Toilet transfer to bedside commode with Contact Guard Assistance.                      Time Tracking:     OT Date of Treatment: 10/22/18  OT Start Time: 1157  OT Stop Time: 1206  OT Total Time (min): 9 min    Billable Minutes:Evaluation 9 minutes    Henry Baca, OT  10/22/2018    "

## 2018-10-22 NOTE — ASSESSMENT & PLAN NOTE
Pt says he had 4x loose, watery bowel movements that were greasy in nature. No blood or dark, tarry quality, no hx clostridium difficile or recent antibiotic use  - C diff EIA ordered

## 2018-10-22 NOTE — PHARMACY MED REC
"Admission Medication Reconciliation - Pharmacy Consult Note    The home medication history was taken by Kayli Stratton, pharmacy technician. Based on information gathered and subsequent review by the clinical pharmacist, the items below may need attention.     You may go to "Admission" then "Reconcile Home Medications" tabs to review and/or act upon these items.     Potentially problematic discrepancies with current MAR  o Patient IS taking the following which was not ordered upon admit  o Tramadol 50 mg Q 6 hr PRN pain   o Ibuprofen 400 mg QD PRN pain     Please address this information as you see fit.  Feel free to contact us if you have any questions or require assistance.  Suri Blas  EXT [phone]     .    .            "

## 2018-10-22 NOTE — ASSESSMENT & PLAN NOTE
Pt says that he felt a sensation similar to his previous episodes of reflux - he says that he has had this nausea/vomiting in the past when he ate spicy food, as he did the night of admission.  - PRN zofran

## 2018-10-22 NOTE — PLAN OF CARE
Problem: Patient Care Overview  Goal: Plan of Care Review  Outcome: Ongoing (interventions implemented as appropriate)  Pt AAO x 4 Pt worked with PT today, No issues with N/V or loose stools C-diff DC Pt put on dysphasia diet without issues. Whiteboard updated. Q 2 monioting for pain and safety.Call light in reach.

## 2018-10-22 NOTE — PLAN OF CARE
Problem: Occupational Therapy Goal  Goal: Occupational Therapy Goal  Goals to be met by: 11/5/2018     Patient will increase functional independence with ADLs by performing:    UE Dressing with Minimal Assistance.  LE Dressing with Minimal Assistance.  Grooming while standing with Minimal Assistance.  Toileting from bedside commode with Minimal Assistance for hygiene and clothing management.   Toilet transfer to bedside commode with Contact Guard Assistance.    Initiate OT POC     Comments: Henry Baca OTR/L  10/22/2018

## 2018-10-22 NOTE — ASSESSMENT & PLAN NOTE
Patient's only episodes of diarrhea and nausea occurred yesterday afternoon and have not reoccurred today. Unlikely to be C. Diff. May have been episode of gastroenteritis.  - Resolved, will continue to monitor

## 2018-10-22 NOTE — PLAN OF CARE
PT A RESIDENT OF [facility]  PCP ALTON CROSS     10/22/18 1031   Discharge Assessment   Assessment Type Discharge Planning Assessment   Confirmed/corrected address and phone number on facesheet? Yes   Assessment information obtained from? Patient   Communicated expected length of stay with patient/caregiver no   Prior to hospitilization cognitive status: No Deficits   Prior to hospitalization functional status: Independent   Current cognitive status: No Deficits   Current Functional Status: Independent   Facility Arrived From: [facility]   Lives With facility resident   Able to Return to Prior Arrangements yes   Is patient able to care for self after discharge? No   Patient's perception of discharge disposition long-term acute care facility (LTAC)   Readmission Within The Last 30 Days no previous admission in last 30 days   Patient currently being followed by outpatient case management? No   Patient currently receives any other outside agency services? No   Equipment Currently Used at Home none   Do you have any problems affording any of your prescribed medications? No   Is the patient taking medications as prescribed? yes   Does the patient have transportation home? Yes   Transportation Available none   Does the patient receive services at the [facility]? No   Discharge Plan A Long-term acute care facility (LTAC)   Discharge Plan B Long-term acute care facility (LTAC)   Patient/Family In Agreement With Plan yes

## 2018-10-22 NOTE — ASSESSMENT & PLAN NOTE
Pt says that his diabetes is diet-controlled  - A1c ordered; if pt has significantly high AM glucose or high A1c may consider accuchecks

## 2018-10-22 NOTE — PROGRESS NOTES
[facility] Hospital Medicine  Progress Note    Patient Name: Neri Regan  MRN: [id number]  Patient Class: IP- Inpatient   Admission Date: 10/21/2018  Length of Stay: 1 days  Attending Physician: Owen Robertson MD  Primary Care Provider: Enrique Dubois MD    Hospital Medicine Team: [facility] 4 Gypsy Tamayo MD    Subjective:     Principal Problem:Aspiration pneumonia    HPI:  63M with hx stroke (06/2017, residual L sided weakness), HTN, HLD [facility] having an episode of emesis followed by vomiting. Pt says that earlier today he had a bite of steak for dinner and immediately became nauseated and vomited. He says that as soon as the food was in his mouth he became nauseous; denies having recent sensation of food getting stuck or regurgitating partially digested food. Pt says that he vomited x3 in rapid succession 2/2 nausea. He says vomit was non-bloody and non-bilious. Shortly thereafter he had 4 lose BMs which he describes as watery, greasy, non-bloody, denies black/dark appearance. He says that afterward he was helped to his bed and had a few sips of water, which made him feel better. Pt's says his PCP was called and suggested that he go to the ED. Pt also mentions that he has had a cough productive of yellow sputum for the last 3 days; denies fever, chills, body aches, weakness, dyspnea, chest pain, abdominal pain, or hemoptysis.    In the ED pt had an initial HR of 138, developed a temperature of 103.4F. WBC was 10.5K, lactate 1.4, CXR without obvious consolidation but CT A/P showed findings compatible with developing pneumonitis in RLL. Pt was given 1500 CC NS, 1500 mg vancomycin x1, 4.5 g piperacillin-tazobactam x1, and admitted to Hospital Medicine    Hospital Course:  No notes on file    Interval History: He states that he is doing well this morning with no complaints. He has not had diarrhea or nausea since yesterday. He denies abdominal  pain, chest pain, nausea, vomiting and SOB.       Review of Systems  Objective:     Vital Signs (Most Recent):  Temp: 99.1 °F (37.3 °C) (10/22/18 0859)  Pulse: (!) 58 (10/22/18 0859)  Resp: 16 (10/22/18 0859)  BP: 112/69 (10/22/18 0859)  SpO2: 95 % (10/22/18 0859) Vital Signs (24h Range):  Temp:  [96.3 °F (35.7 °C)-103.4 °F (39.7 °C)] 99.1 °F (37.3 °C)  Pulse:  [] 58  Resp:  [16-18] 16  SpO2:  [94 %-98 %] 95 %  BP: (109-148)/(64-90) 112/69     Weight: 91.3 kg (201 lb 4.5 oz)  Body mass index is 26.56 kg/m².    Intake/Output Summary (Last 24 hours) at 10/22/2018 1006  Last data filed at 10/21/2018 2236  Gross per 24 hour   Intake 1850 ml   Output --   Net 1850 ml      Physical Exam   Constitutional: He is oriented to person, place, and time. He appears well-developed and well-nourished. No distress.   HENT:   Head: Normocephalic and atraumatic.   Nose: Nose normal.   Eyes: Conjunctivae and EOM are normal. Right eye exhibits no discharge. Left eye exhibits no discharge.   Neck: Neck supple.   Cardiovascular: Normal rate, regular rhythm and normal heart sounds. Exam reveals no gallop and no friction rub.   No murmur heard.  Pulmonary/Chest: Effort normal. No respiratory distress. He has no wheezes. He has no rales.   Decreased air movement on the right lower lung field with rhonchi.   Abdominal: Soft. Bowel sounds are normal. There is no tenderness. There is no rebound and no guarding.   Musculoskeletal: He exhibits no edema or tenderness.   Lymphadenopathy:     He has no cervical adenopathy.   Neurological: He is alert and oriented to person, place, and time.   Skin: Skin is warm and dry. He is not diaphoretic.   Psychiatric: He has a normal mood and affect. His behavior is normal. Thought content normal.   Nursing note and vitals reviewed.      Significant Labs:   BMP:   Recent Labs   Lab 10/22/18  0504         K 3.6   *   CO2 21*   BUN 17   CREATININE 0.9   CALCIUM 9.6   MG 1.8     CBC:  "  Recent Labs   Lab 10/21/18  1849 10/22/18  0504   WBC 10.51 8.56   HGB 15.2 12.3*   HCT 45.6 37.0*   * 131*         Assessment/Plan:      * Aspiration pneumonia    Recent episode of n/v with 3x successive episodes of emesis followed by fever, CT A/P showed "bilateral bibasilar subsegmental atelectasis involving the lower lobes, some of which, on the right, appears more confluency. Developing infectious processes not excluded. Scattered tree-in-bud nodular opacity noted within the right lower lobe, could reflect edema or infectious/non infectious pneumonitis. Given vanc + zosyn x1 dose in ED + 1500 cc    - Continue on Unasyn 1.5g Q6H for aspiration pneumonia  -NPO now due to concern for aspiration pending SLP evaluation  -started on Prednisone 40mg daily x 5 days to decrease inflammation    - Bcx and sputum culture pending     Non-intractable vomiting with nausea    Pt says that he felt a sensation similar to his previous episodes of reflux - he says that he has had this nausea/vomiting in the past when he ate spicy food, as he did the night of admission.  - PRN zofran     Diarrhea    Patient's only episodes of diarrhea and nausea occurred yesterday afternoon and have not reoccurred today. Unlikely to be C. Diff. May have been episode of gastroenteritis.  - Resolved, will continue to monitor       Essential hypertension    Home meds: Losartan 50 mg daily  - continued       Seasonal allergies    Home meds: fluticasone, cetirizine 10 mg daily  - Continued       History of stroke with residual deficit    Home meds: Atorvastatin 40 mg qHS, ASA 81 mg daily  -Continued  - PT/OT/SLP ordered     Type 2 diabetes mellitus, without long-term current use of insulin    Pt says that his diabetes is diet-controlled. Last A1c 5.3 on 10/21  -will add ISS as needed                  VTE Risk Mitigation (From admission, onward)        Ordered     IP VTE LOW RISK PATIENT  Once      10/21/18 2108     Place WILBER hose  Until " discontinued      10/21/18 2108     Place sequential compression device  Until discontinued      10/21/18 2108              Gypsy Tamayo MD  Department of Hospital Medicine   [facility]

## 2018-10-22 NOTE — SUBJECTIVE & OBJECTIVE
"Past Medical History:   Diagnosis Date    Hyperlipidemia     Hypertension     Stroke        Past Surgical History:   Procedure Laterality Date    ABDOMINAL SURGERY N/A     Piece of metal lodged in abdomen - workplace injury, surgical removal    CHOLECYSTECTOMY      STOMACH SURGERY         Review of patient's allergies indicates:  No Known Allergies    No current facility-administered medications on file prior to encounter.      Current Outpatient Medications on File Prior to Encounter   Medication Sig    atorvastatin (LIPITOR) 40 MG tablet Take 40 mg by mouth every evening.    fluticasone (FLONASE) 50 mcg/actuation nasal spray 1 spray by Each Nare route once daily.    loratadine (CLARITIN) 10 mg tablet Take 10 mg by mouth once daily.    losartan (COZAAR) 50 MG tablet Take 50 mg by mouth once daily.    traMADol (ULTRAM) 50 mg tablet Take 50 mg by mouth every 8 (eight) hours as needed for Pain.     Family History     Family history is unknown by patient.        Tobacco Use    Smoking status: Former Smoker    Smokeless tobacco: Current User     Types: Snuff    Tobacco comment: Quit 2 months ago   Substance and Sexual Activity    Alcohol use: No     Frequency: Never     Comment: Last drink 4 years ago    Drug use: No    Sexual activity: Not on file     Review of Systems   Constitutional: Negative for chills, fatigue and fever.        Pt says "I feel pretty good"   Respiratory: Positive for cough. Negative for apnea, choking, chest tightness, shortness of breath, wheezing and stridor.    Cardiovascular: Negative for chest pain, palpitations and leg swelling.   Gastrointestinal: Positive for diarrhea, nausea and vomiting. Negative for abdominal distention and abdominal pain.   Genitourinary: Negative for decreased urine volume, dysuria, frequency, hematuria and urgency.   Musculoskeletal: Negative for arthralgias, back pain, myalgias, neck pain and neck stiffness.   Neurological: Positive for facial " asymmetry. Negative for dizziness, tremors, speech difficulty and light-headedness.     Objective:     Vital Signs (Most Recent):  Temp: (!) 101.2 °F (38.4 °C) (10/21/18 2050)  Pulse: 87 (10/21/18 2050)  Resp: 16 (10/21/18 2050)  BP: 116/75 (10/21/18 2050)  SpO2: 95 % (10/21/18 2050) Vital Signs (24h Range):  Temp:  [99.9 °F (37.7 °C)-103.4 °F (39.7 °C)] 101.2 °F (38.4 °C)  Pulse:  [] 87  Resp:  [16-18] 16  SpO2:  [95 %-97 %] 95 %  BP: (116-148)/(75-90) 116/75     Weight: 90.7 kg (200 lb)  Body mass index is 26.39 kg/m².    Physical Exam   Constitutional: He is oriented to person, place, and time. He appears well-developed and well-nourished. No distress.   HENT:   Right Ear: External ear normal.   Left Ear: External ear normal.   Mouth/Throat: Oropharynx is clear and moist.   Asymmetric facies with L facial droop - pt reports this is chronic   Eyes: EOM are normal. Pupils are equal, round, and reactive to light. Right eye exhibits no discharge. Left eye exhibits no discharge. No scleral icterus.   Neck: Normal range of motion. Neck supple. No JVD present. No tracheal deviation present.   Cardiovascular: Normal rate, regular rhythm and intact distal pulses.   No murmur heard.  Pulmonary/Chest: Effort normal.   R posterior basilar breath sounds decreased compared to left. No wheezes, rales, rhonchi.   Abdominal: Soft. Bowel sounds are normal. He exhibits no distension. There is no tenderness.   Firm, non-rigid. RUQ cholecystectomy scar and midline laparotomy scar noted, well-healed.   Musculoskeletal: Normal range of motion. He exhibits no edema.   Neurological: He is alert and oriented to person, place, and time. He has normal strength.   Skin: Skin is warm. Capillary refill takes less than 2 seconds. He is not diaphoretic. No pallor.         CRANIAL NERVES     CN III, IV, VI   Pupils are equal, round, and reactive to light.  Extraocular motions are normal.        Significant Labs:   CBC:   Recent Labs   Lab  10/21/18  1849   WBC 10.51   HGB 15.2   HCT 45.6   *     CMP:   Recent Labs   Lab 10/21/18  1849      K 4.3      CO2 22*   *   BUN 18   CREATININE 1.1   CALCIUM 10.9*   PROT 9.1*   ALBUMIN 4.3   BILITOT 0.8   ALKPHOS 94   AST 29   ALT 24   ANIONGAP 11   EGFRNONAA >60.0     Lactic Acid:   Recent Labs   Lab 10/21/18  1940   LACTATE 1.4     Troponin:   Recent Labs   Lab 10/21/18  1849 10/21/18  2114   TROPONINI <0.006 0.013     Urine Studies:   Recent Labs   Lab 10/21/18  1912   COLORU Yellow   APPEARANCEUA Clear   PHUR 5.0   SPECGRAV 1.025   PROTEINUA Negative   GLUCUA Negative   KETONESU Negative   BILIRUBINUA Negative   OCCULTUA Negative   NITRITE Negative   UROBILINOGEN 2.0   LEUKOCYTESUR Negative       Significant Imaging: I have reviewed all pertinent imaging results/findings within the past 24 hours.

## 2018-10-22 NOTE — ASSESSMENT & PLAN NOTE
"Recent episode of n/v with 3x successive episodes of emesis followed by fever, CT A/P showed "bilateral bibasilar subsegmental atelectasis involving the lower lobes, some of which, on the right, appears more confluency. Developing infectious processes not excluded. Scattered tree-in-bud nodular opacity noted within the right lower lobe, could reflect edema or infectious/non infectious pneumonitis.  - Given vanc + zosyn x1 dose in ED + 1500 cc  - Pt without signs of end-organ damage (i.e. no LEROY, encephalopathy, transaminitis) to suggest sepsis; however pt with 3 days of productive cough preceding possible aspiration.  -- Will continue on Unasyn 1.5g Q6H for aspiration pneumonia, may consider converting to Augmentin once pt seen by SLP and cleared for diet  - Blood cultures done, sputum cultured ordered  "

## 2018-10-22 NOTE — SUBJECTIVE & OBJECTIVE
Interval History: He states that he is doing well this morning. He has not not diarrhea or nausea since yesterday. He denies abdominal pain, chest pain, and SOB.    Review of Systems  Objective:     Vital Signs (Most Recent):  Temp: 99.1 °F (37.3 °C) (10/22/18 0859)  Pulse: (!) 58 (10/22/18 0859)  Resp: 16 (10/22/18 0859)  BP: 112/69 (10/22/18 0859)  SpO2: 95 % (10/22/18 0859) Vital Signs (24h Range):  Temp:  [96.3 °F (35.7 °C)-103.4 °F (39.7 °C)] 99.1 °F (37.3 °C)  Pulse:  [] 58  Resp:  [16-18] 16  SpO2:  [94 %-98 %] 95 %  BP: (109-148)/(64-90) 112/69     Weight: 91.3 kg (201 lb 4.5 oz)  Body mass index is 26.56 kg/m².    Intake/Output Summary (Last 24 hours) at 10/22/2018 1006  Last data filed at 10/21/2018 2236  Gross per 24 hour   Intake 1850 ml   Output --   Net 1850 ml      Physical Exam   Constitutional: He is oriented to person, place, and time. He appears well-developed and well-nourished. No distress.   HENT:   Head: Normocephalic and atraumatic.   Nose: Nose normal.   Eyes: Conjunctivae and EOM are normal. Right eye exhibits no discharge. Left eye exhibits no discharge.   Neck: Neck supple.   Cardiovascular: Normal rate, regular rhythm and normal heart sounds. Exam reveals no gallop and no friction rub.   No murmur heard.  Pulmonary/Chest: Effort normal. No respiratory distress. He has no wheezes. He has no rales.   Decreased air movement on the right lower lung field with rhonchi.   Abdominal: Soft. Bowel sounds are normal. There is no tenderness. There is no rebound and no guarding.   Musculoskeletal: He exhibits no edema or tenderness.   Lymphadenopathy:     He has no cervical adenopathy.   Neurological: He is alert and oriented to person, place, and time.   Skin: Skin is warm and dry. He is not diaphoretic.   Psychiatric: He has a normal mood and affect. His behavior is normal. Thought content normal.   Nursing note and vitals reviewed.      Significant Labs:   BMP:   Recent Labs   Lab  10/22/18  0504         K 3.6   *   CO2 21*   BUN 17   CREATININE 0.9   CALCIUM 9.6   MG 1.8     CBC:   Recent Labs   Lab 10/21/18  1849 10/22/18  0504   WBC 10.51 8.56   HGB 15.2 12.3*   HCT 45.6 37.0*   * 131*

## 2018-10-23 VITALS
SYSTOLIC BLOOD PRESSURE: 147 MMHG | TEMPERATURE: 96 F | OXYGEN SATURATION: 96 % | BODY MASS INDEX: 26.67 KG/M2 | RESPIRATION RATE: 20 BRPM | HEIGHT: 73 IN | WEIGHT: 201.25 LBS | DIASTOLIC BLOOD PRESSURE: 76 MMHG | HEART RATE: 51 BPM

## 2018-10-23 LAB
ANION GAP SERPL CALC-SCNC: 7 MMOL/L
BASOPHILS # BLD AUTO: 0.02 K/UL
BASOPHILS NFR BLD: 0.3 %
BUN SERPL-MCNC: 17 MG/DL
CALCIUM SERPL-MCNC: 9.3 MG/DL
CHLORIDE SERPL-SCNC: 113 MMOL/L
CO2 SERPL-SCNC: 20 MMOL/L
CREAT SERPL-MCNC: 0.9 MG/DL
DIFFERENTIAL METHOD: ABNORMAL
EOSINOPHIL # BLD AUTO: 0 K/UL
EOSINOPHIL NFR BLD: 0.3 %
ERYTHROCYTE [DISTWIDTH] IN BLOOD BY AUTOMATED COUNT: 13.1 %
EST. GFR  (AFRICAN AMERICAN): >60 ML/MIN/1.73 M^2
EST. GFR  (NON AFRICAN AMERICAN): >60 ML/MIN/1.73 M^2
GLUCOSE SERPL-MCNC: 115 MG/DL
HCT VFR BLD AUTO: 33.5 %
HGB BLD-MCNC: 11 G/DL
IMM GRANULOCYTES # BLD AUTO: 0.02 K/UL
IMM GRANULOCYTES NFR BLD AUTO: 0.3 %
LYMPHOCYTES # BLD AUTO: 1 K/UL
LYMPHOCYTES NFR BLD: 15.9 %
MAGNESIUM SERPL-MCNC: 1.9 MG/DL
MCH RBC QN AUTO: 30.5 PG
MCHC RBC AUTO-ENTMCNC: 32.8 G/DL
MCV RBC AUTO: 93 FL
MONOCYTES # BLD AUTO: 0.5 K/UL
MONOCYTES NFR BLD: 7.4 %
NEUTROPHILS # BLD AUTO: 4.8 K/UL
NEUTROPHILS NFR BLD: 75.8 %
NRBC BLD-RTO: 0 /100 WBC
PHOSPHATE SERPL-MCNC: 2.2 MG/DL
PLATELET # BLD AUTO: 134 K/UL
PMV BLD AUTO: 10.7 FL
POTASSIUM SERPL-SCNC: 3.7 MMOL/L
RBC # BLD AUTO: 3.61 M/UL
SODIUM SERPL-SCNC: 140 MMOL/L
WBC # BLD AUTO: 6.34 K/UL

## 2018-10-23 PROCEDURE — 83735 ASSAY OF MAGNESIUM: CPT

## 2018-10-23 PROCEDURE — 25000003 PHARM REV CODE 250: Performed by: STUDENT IN AN ORGANIZED HEALTH CARE EDUCATION/TRAINING PROGRAM

## 2018-10-23 PROCEDURE — G8997 SWALLOW GOAL STATUS: HCPCS | Mod: CI

## 2018-10-23 PROCEDURE — 85025 COMPLETE CBC W/AUTO DIFF WBC: CPT

## 2018-10-23 PROCEDURE — 63600175 PHARM REV CODE 636 W HCPCS: Performed by: STUDENT IN AN ORGANIZED HEALTH CARE EDUCATION/TRAINING PROGRAM

## 2018-10-23 PROCEDURE — G8996 SWALLOW CURRENT STATUS: HCPCS | Mod: CI

## 2018-10-23 PROCEDURE — 97161 PT EVAL LOW COMPLEX 20 MIN: CPT

## 2018-10-23 PROCEDURE — 80048 BASIC METABOLIC PNL TOTAL CA: CPT

## 2018-10-23 PROCEDURE — 25000242 PHARM REV CODE 250 ALT 637 W/ HCPCS: Performed by: STUDENT IN AN ORGANIZED HEALTH CARE EDUCATION/TRAINING PROGRAM

## 2018-10-23 PROCEDURE — 36415 COLL VENOUS BLD VENIPUNCTURE: CPT

## 2018-10-23 PROCEDURE — 99239 HOSP IP/OBS DSCHRG MGMT >30: CPT | Mod: ,,, | Performed by: HOSPITALIST

## 2018-10-23 PROCEDURE — 84100 ASSAY OF PHOSPHORUS: CPT

## 2018-10-23 PROCEDURE — 97116 GAIT TRAINING THERAPY: CPT

## 2018-10-23 PROCEDURE — G8998 SWALLOW D/C STATUS: HCPCS | Mod: CI

## 2018-10-23 PROCEDURE — 92610 EVALUATE SWALLOWING FUNCTION: CPT

## 2018-10-23 RX ORDER — SODIUM,POTASSIUM PHOSPHATES 280-250MG
2 POWDER IN PACKET (EA) ORAL
Status: DISCONTINUED | OUTPATIENT
Start: 2018-10-23 | End: 2018-10-23 | Stop reason: HOSPADM

## 2018-10-23 RX ORDER — PREDNISONE 20 MG/1
40 TABLET ORAL DAILY
Qty: 6 TABLET | Refills: 0
Start: 2018-10-24 | End: 2018-10-27

## 2018-10-23 RX ORDER — PANTOPRAZOLE SODIUM 20 MG/1
20 TABLET, DELAYED RELEASE ORAL DAILY
Qty: 3 TABLET | Refills: 0
Start: 2018-10-23 | End: 2018-10-26

## 2018-10-23 RX ADMIN — FLUTICASONE PROPIONATE 50 MCG: 50 SPRAY, METERED NASAL at 08:10

## 2018-10-23 RX ADMIN — PREDNISONE 40 MG: 20 TABLET ORAL at 08:10

## 2018-10-23 RX ADMIN — ASPIRIN 81 MG CHEWABLE TABLET 81 MG: 81 TABLET CHEWABLE at 08:10

## 2018-10-23 RX ADMIN — AMPICILLIN AND SULBACTAM 1.5 G: 1; .5 INJECTION, POWDER, FOR SOLUTION INTRAVENOUS at 01:10

## 2018-10-23 RX ADMIN — CETIRIZINE HYDROCHLORIDE 10 MG: 5 TABLET ORAL at 08:10

## 2018-10-23 RX ADMIN — LOSARTAN POTASSIUM 50 MG: 25 TABLET, FILM COATED ORAL at 08:10

## 2018-10-23 RX ADMIN — AMPICILLIN AND SULBACTAM 1.5 G: 1; .5 INJECTION, POWDER, FOR SOLUTION INTRAVENOUS at 08:10

## 2018-10-23 NOTE — PLAN OF CARE
Patient expected to discharge to [facility] today, pending updated progress note, SW following, will update as needed.

## 2018-10-23 NOTE — PLAN OF CARE
10/23/18 1343   Final Note   Assessment Type Final Discharge Note   Anticipated Discharge Disposition [facility]   Discharge plans and expectations educations in teach back method with documentation complete? Yes   Right Care Referral Info   Post Acute Recommendation ([facility])

## 2018-10-23 NOTE — PT/OT/SLP EVAL
Speech Language Pathology Evaluation/Discharge  Bedside Swallow    Patient Name:  Neri Regan   MRN:  [id number]  Admitting Diagnosis: Aspiration pneumonitis    Recommendations:                 General Recommendations:  Follow-up not indicated  Diet recommendations:  Regular, Thin   Aspiration Precautions: 1 bite/sip at a time, Avoid talking while eating, HOB to 90 degrees, Monitor for s/s of aspiration, Small bites/sips and Standard aspiration precautions   General Precautions: Standard, aspiration, fall  Communication strategies:  none    History:     Past Medical History:   Diagnosis Date    Hyperlipidemia     Hypertension     Stroke        Past Surgical History:   Procedure Laterality Date    ABDOMINAL SURGERY N/A     Piece of metal lodged in abdomen - workplace injury, surgical removal    CHOLECYSTECTOMY      STOMACH SURGERY       Social History: Patient is a resident of [facility].  Pt s/o stroke in 2017.     Prior Intubation HX:  None during this admission    Modified Barium Swallow: none on file    Chest X-Rays: 10/21/18:   COMPARISON:  October 21, 2018.  FINDINGS:  Heart size and pulmonary vascularity is similar.  Few increased markings at the bases likely shallow depth of inspiration.  May be some linear atelectasis left midlung field.  No pneumothorax.      Impression     There are some increased interstitial markings at the bases likely related to shallow depth of inspiration.  Developing infiltrates not excluded     CT of abdomen: 10/21/18:   Impression     1. Bilateral basilar subsegmental atelectasis involving the lower lobes, some of which, on the right, appears more confluency.  Developing infectious process not excluded.  Scattered tree-in-bud nodular opacity noted within the right lower lobe, could reflect edema or infectious/non infectious pneumonitis.  Correlation is advised.  2. Apparent secretions within the right lower lobe bronchus, places patient at risk for  "aspiration.     Prior diet: mechanical soft/thins liquids. Pt reports regular diet prior to this hospitalization.     Subjective     "[facility] done a good job."    Pain/Comfort:  · Pain Rating 1: 0/10    Objective:     Oral Musculature Evaluation  · Oral Musculature: WFL  · Dentition: teeth in poor condition, scattered dentition  · Secretion Management: adequate  · Mucosal Quality: good  · Mandibular Strength and Mobility: WFL  · Oral Labial Strength and Mobility: WFL  · Lingual Strength and Mobility: WFL  · Velar Elevation: WFL  · Buccal Strength and Mobility: WFL  · Volitional Cough: strong  · Volitional Swallow: elicited; timely; good elevation/excursion  · Voice Prior to PO Intake: dry, clear    Bedside Swallow Eval:   Consistencies Assessed:  · Thin liquids multiple cup and straw sips  · Solids rahul cracker (4 portions)     Oral Phase:   · WFL    Pharyngeal Phase:   · no overt clinical signs/symptoms of aspiration  · no overt clinical signs/symptoms of pharyngeal dysphagia    Compensatory Strategies  · None    Treatment: Education provided to pt regarding role of SLP, purpose of swallowing assessment, aspiration, overt s/s of aspiration, complications a/w aspiration, mechanical soft vs regular consistency diet, recommendations to advance to a regular consistency diet, and aspiration precautions and safe swallowing strategies recommended. SLP explained that pt does not exhibit dysphagia and does not warrant further skilled SLP services to address swallowing function. However, pt encouraged pt to report to nurse, MD, or SLP at NH if swallowing difficulties arise. Pt expressed understanding and agreement. White board updated. MD and nurse notified of recommendations.     Assessment:     Neri Regan is a 63 y.o. male. Oral and pharyngeal phases of the swallow found to be WFL.  SLP recommending advancing diet to regular consistencies with aspiration precautions being followed. No further skilled SLP services " for addressing swallowing function are warranted at this time.    Goals:   Multidisciplinary Problems     SLP Goals     Not on file                Plan:     · Plan of Care reviewed with:  patient   · SLP Follow-Up:  No       Discharge recommendations:  (no further skilled SLP services recommended at this time for swallowing function)     Time Tracking:     SLP Treatment Date:   10/23/18  Speech Start Time:  0831  Speech Stop Time:  0843     Speech Total Time (min):  12 min    Billable Minutes: Eval Swallow and Oral Function 12    HALEY Wilkerson, CCC-SLP  10/23/2018       HALEY Wilkerson, CCC-SLP  Speech Language Pathologist  [phone]  10/23/2018

## 2018-10-23 NOTE — SUBJECTIVE & OBJECTIVE
Interval History: Patient feels well with no complaints. No cough, no further aspiration events. Cleared for regular diet by speech.     Review of Systems  Objective:     Vital Signs (Most Recent):  Temp: (!) 95.9 °F (35.5 °C) (10/23/18 1255)  Pulse: (!) 51 (10/23/18 1255)  Resp: 20 (10/23/18 1255)  BP: (!) 147/76 (10/23/18 1255)  SpO2: 96 % (10/23/18 1255) Vital Signs (24h Range):  Temp:  [95.9 °F (35.5 °C)-98.3 °F (36.8 °C)] 95.9 °F (35.5 °C)  Pulse:  [50-73] 51  Resp:  [16-20] 20  SpO2:  [93 %-96 %] 96 %  BP: (101-156)/(55-82) 147/76     Weight: 91.3 kg (201 lb 4.5 oz)  Body mass index is 26.56 kg/m².    Intake/Output Summary (Last 24 hours) at 10/23/2018 1255  Last data filed at 10/23/2018 1000  Gross per 24 hour   Intake 100 ml   Output 300 ml   Net -200 ml      Physical Exam   Constitutional: He is oriented to person, place, and time. He appears well-developed and well-nourished. No distress.   HENT:   Head: Normocephalic and atraumatic.   Nose: Nose normal.   Eyes: Conjunctivae and EOM are normal. Right eye exhibits no discharge. Left eye exhibits no discharge.   Neck: Neck supple.   Cardiovascular: Normal rate, regular rhythm and normal heart sounds. Exam reveals no gallop and no friction rub.   No murmur heard.  Pulmonary/Chest: Effort normal. No respiratory distress. He has no wheezes.   Few rales right base   Abdominal: Soft. Bowel sounds are normal. There is no tenderness. There is no rebound and no guarding.   Musculoskeletal: He exhibits no edema or tenderness.   Lymphadenopathy:     He has no cervical adenopathy.   Neurological: He is alert and oriented to person, place, and time.   Skin: Skin is warm and dry. He is not diaphoretic.   Psychiatric: He has a normal mood and affect. His behavior is normal. Thought content normal.   Nursing note and vitals reviewed.      Significant Labs:   CBC:   Recent Labs   Lab 10/21/18  1849 10/22/18  0504 10/23/18  0438   WBC 10.51 8.56 6.34   HGB 15.2 12.3* 11.0*    HCT 45.6 37.0* 33.5*   * 131* 134*     CMP:   Recent Labs   Lab 10/21/18  1849 10/22/18  0504 10/23/18  0438    139 140   K 4.3 3.6 3.7    111* 113*   CO2 22* 21* 20*   * 109 115*   BUN 18 17 17   CREATININE 1.1 0.9 0.9   CALCIUM 10.9* 9.6 9.3   PROT 9.1*  --   --    ALBUMIN 4.3  --   --    BILITOT 0.8  --   --    ALKPHOS 94  --   --    AST 29  --   --    ALT 24  --   --    ANIONGAP 11 7* 7*   EGFRNONAA >60.0 >60.0 >60.0       Significant Imaging: I have reviewed all pertinent imaging results/findings within the past 24 hours.

## 2018-10-23 NOTE — ASSESSMENT & PLAN NOTE
"Recent episode of n/v with 3x successive episodes of emesis followed by fever, CT A/P showed "bilateral bibasilar subsegmental atelectasis involving the lower lobes, some of which, on the right, appears more confluency. Developing infectious processes not excluded. Scattered tree-in-bud nodular opacity noted within the right lower lobe, could reflect edema or infectious/non infectious pneumonitis.     With negative procalcitonin, no leukocytosis, and history of event, likely represents pneumonitis and not any true pneumonia.     - discharge with prednisone to complete five day course  - discontinue antibiotics  "

## 2018-10-23 NOTE — HOSPITAL COURSE
Mr. Regan was admitted to [facility] on 10/21 for shortness of breath and fever after an aspiration episode. Patient was initially started on IV unasyn to cover for aspiration pneumonia. However, procalcitonin was negative, and fevers quickly resolved. Patient had significant clinical improvement and was back to his baseline on 10/23 and antibiotics were discontinued, as patient's presentation was more indicative of aspiration pneumonitis than pneumonia. He was discharged back to [facility] with oral prednisone to complete five day course, along with protonix for GI protection.

## 2018-10-23 NOTE — DISCHARGE SUMMARY
[facility] Hospital Medicine  Discharge Summary      Patient Name: Neri Regan  MRN: [id number]  Admission Date: 10/21/2018  Hospital Length of Stay: 2 days  Discharge Date and Time:  10/23/2018 1:01 PM  Attending Physician: Robbin Hurtado, *   Discharging Provider: Amanda Vallejo DO  Primary Care Provider: Enrique Dubois MD  Hospital Medicine Team: [facility] 4 Amanda Vallejo DO    HPI:   63M with hx stroke (06/2017, residual L sided weakness), HTN, HLD presents to ED from [facility] after having an episode of emesis followed by vomiting. Pt says that earlier today he had a bite of steak for dinner and immediately became nauseated and vomited. He says that as soon as the food was in his mouth he became nauseous; denies having recent sensation of food getting stuck or regurgitating partially digested food. Pt says that he vomited x3 in rapid succession 2/2 nausea. He says vomit was non-bloody and non-bilious. Shortly thereafter he had 4 lose BMs which he describes as watery, greasy, non-bloody, denies black/dark appearance. He says that afterward he was helped to his bed and had a few sips of water, which made him feel better. Pt's says his PCP was called and suggested that he go to the ED. Pt also mentions that he has had a cough productive of yellow sputum for the last 3 days; denies fever, chills, body aches, weakness, dyspnea, chest pain, abdominal pain, or hemoptysis.    In the ED pt had an initial HR of 138, developed a temperature of 103.4F. WBC was 10.5K, lactate 1.4, CXR without obvious consolidation but CT A/P showed findings compatible with developing pneumonitis in RLL. Pt was given 1500 CC NS, 1500 mg vancomycin x1, 4.5 g piperacillin-tazobactam x1, and admitted to Hospital Medicine    * No surgery found *      Hospital Course:   Mr. Regan was admitted to [facility] on 10/21 for shortness of breath and fever after an aspiration episode. Patient was  initially started on IV unasyn to cover for aspiration pneumonia. However,patient did not have any cough, sputum production, or leukocytosis, and fevers quickly resolved. Patient had significant clinical improvement and was back to his baseline on 10/23 and antibiotics were discontinued, as patient's presentation was more indicative of aspiration pneumonitis than pneumonia. He was discharged back to [facility] with oral prednisone to complete five day course, along with protonix for GI protection.      Consults:      Interval History: Patient feels well with no complaints. No cough, no further aspiration events. Cleared for regular diet by speech.     Review of Systems  Objective:     Vital Signs (Most Recent):  Temp: (!) 95.9 °F (35.5 °C) (10/23/18 1255)  Pulse: (!) 51 (10/23/18 1255)  Resp: 20 (10/23/18 1255)  BP: (!) 147/76 (10/23/18 1255)  SpO2: 96 % (10/23/18 1255) Vital Signs (24h Range):  Temp:  [95.9 °F (35.5 °C)-98.3 °F (36.8 °C)] 95.9 °F (35.5 °C)  Pulse:  [50-73] 51  Resp:  [16-20] 20  SpO2:  [93 %-96 %] 96 %  BP: (101-156)/(55-82) 147/76     Weight: 91.3 kg (201 lb 4.5 oz)  Body mass index is 26.56 kg/m².    Intake/Output Summary (Last 24 hours) at 10/23/2018 1255  Last data filed at 10/23/2018 1000  Gross per 24 hour   Intake 100 ml   Output 300 ml   Net -200 ml      Physical Exam   Constitutional: He is oriented to person, place, and time. He appears well-developed and well-nourished. No distress.   HENT:   Head: Normocephalic and atraumatic.   Nose: Nose normal.   Eyes: Conjunctivae and EOM are normal. Right eye exhibits no discharge. Left eye exhibits no discharge.   Neck: Neck supple.   Cardiovascular: Normal rate, regular rhythm and normal heart sounds. Exam reveals no gallop and no friction rub.   No murmur heard.  Pulmonary/Chest: Effort normal. No respiratory distress. He has no wheezes.   Few rales right base   Abdominal: Soft. Bowel sounds are normal. There is no tenderness. There is  "no rebound and no guarding.   Musculoskeletal: He exhibits no edema or tenderness.   Lymphadenopathy:     He has no cervical adenopathy.   Neurological: He is alert and oriented to person, place, and time.   Skin: Skin is warm and dry. He is not diaphoretic.   Psychiatric: He has a normal mood and affect. His behavior is normal. Thought content normal.   Nursing note and vitals reviewed.      Significant Labs:   CBC:   Recent Labs   Lab 10/21/18  1849 10/22/18  0504 10/23/18  0438   WBC 10.51 8.56 6.34   HGB 15.2 12.3* 11.0*   HCT 45.6 37.0* 33.5*   * 131* 134*     CMP:   Recent Labs   Lab 10/21/18  1849 10/22/18  0504 10/23/18  0438    139 140   K 4.3 3.6 3.7    111* 113*   CO2 22* 21* 20*   * 109 115*   BUN 18 17 17   CREATININE 1.1 0.9 0.9   CALCIUM 10.9* 9.6 9.3   PROT 9.1*  --   --    ALBUMIN 4.3  --   --    BILITOT 0.8  --   --    ALKPHOS 94  --   --    AST 29  --   --    ALT 24  --   --    ANIONGAP 11 7* 7*   EGFRNONAA >60.0 >60.0 >60.0       Significant Imaging: I have reviewed all pertinent imaging results/findings within the past 24 hours.        * Aspiration pneumonitis    Recent episode of n/v with 3x successive episodes of emesis followed by fever, CT A/P showed "bilateral bibasilar subsegmental atelectasis involving the lower lobes, some of which, on the right, appears more confluency. Developing infectious processes not excluded. Scattered tree-in-bud nodular opacity noted within the right lower lobe, could reflect edema or infectious/non infectious pneumonitis.     With no preceding symptoms of pneumonia, no leukocytosis, and history of event, likely represents pneumonitis and not any true pneumonia.     - discharge with prednisone to complete five day course  - discontinue antibiotics     Non-intractable vomiting with nausea    RESOLVED     Diarrhea    RESOLVED       Essential hypertension    Home meds: Losartan 50 mg daily  - continued       History of stroke with residual " deficit    Home meds: Atorvastatin 40 mg qHS, ASA 81 mg daily  -Continued  - PT/OT/SLP ordered     Type 2 diabetes mellitus, without long-term current use of insulin    Pt says that his diabetes is diet-controlled. Last A1c 5.3 on 10/21  -will add ISS as needed     Fever    resolved         Final Active Diagnoses:    Diagnosis Date Noted POA    PRINCIPAL PROBLEM:  Aspiration pneumonitis [J69.0] 10/21/2018 Yes    Fever [R50.9] 10/21/2018 Yes    Type 2 diabetes mellitus, without long-term current use of insulin [E11.9] 10/21/2018 Yes    History of stroke with residual deficit [I69.30] 10/21/2018 Not Applicable    Essential hypertension [I10] 10/21/2018 Yes    Diarrhea [R19.7] 10/21/2018 Yes    Non-intractable vomiting with nausea [R11.2] 10/21/2018 Yes      Problems Resolved During this Admission:       Discharged Condition: good    Disposition: Skilled Nursing Facility    Follow Up:  Follow-up Information     Enrique Dubois MD In 2 weeks.    Specialty:  Internal Medicine  Contact information:  [address]  [facility] [id number]  [place] [address] [phone]                 Patient Instructions:      Diet Adult Regular       Significant Diagnostic Studies: Labs:   CMP   Recent Labs   Lab 10/21/18  1849 10/22/18  0504 10/23/18  0438    139 140   K 4.3 3.6 3.7    111* 113*   CO2 22* 21* 20*   * 109 115*   BUN 18 17 17   CREATININE 1.1 0.9 0.9   CALCIUM 10.9* 9.6 9.3   PROT 9.1*  --   --    ALBUMIN 4.3  --   --    BILITOT 0.8  --   --    ALKPHOS 94  --   --    AST 29  --   --    ALT 24  --   --    ANIONGAP 11 7* 7*   ESTGFRAFRICA >60.0 >60.0 >60.0   EGFRNONAA >60.0 >60.0 >60.0    and CBC   Recent Labs   Lab 10/21/18  1849 10/22/18  0504 10/23/18  0438   WBC 10.51 8.56 6.34   HGB 15.2 12.3* 11.0*   HCT 45.6 37.0* 33.5*   * 131* 134*       Pending Diagnostic Studies:     None         Medications:  Reconciled Home Medications:      Medication List      START taking these medications     pantoprazole 20 MG tablet  Commonly known as:  PROTONIX  Take 1 tablet (20 mg total) by mouth once daily. for 3 days     predniSONE 20 MG tablet  Commonly known as:  DELTASONE  Take 2 tablets (40 mg total) by mouth once daily. for 3 days  Start taking on:  10/24/2018        CONTINUE taking these medications    acetaminophen 500 MG tablet  Commonly known as:  TYLENOL  Take 1,000 mg by mouth every 8 (eight) hours as needed for Pain.     aspirin 81 MG EC tablet  Commonly known as:  ECOTRIN  Take 81 mg by mouth once daily.     atorvastatin 40 MG tablet  Commonly known as:  LIPITOR  Take 40 mg by mouth every evening.     fluticasone 50 mcg/actuation nasal spray  Commonly known as:  FLONASE  1 spray by Each Nare route once daily.     guaifenesin 100 mg/5 ml 100 mg/5 mL syrup  Commonly known as:  ROBITUSSIN  Take 200 mg by mouth every 6 (six) hours as needed for Cough.     hydrocortisone 1 % cream  Apply to rash on back twice daily until resolved     ibuprofen 400 MG tablet  Commonly known as:  ADVIL,MOTRIN  Take 400 mg by mouth daily as needed (pain).     ICY HOT ADVANCED RELIEF TOP  Apply every other night     loratadine 10 mg tablet  Commonly known as:  CLARITIN  Take 10 mg by mouth once daily.     losartan 50 MG tablet  Commonly known as:  COZAAR  Take 50 mg by mouth once daily.     nystatin cream  Commonly known as:  MYCOSTATIN  Apply to yeast rash on buttocks twice daily        STOP taking these medications    traMADol 50 mg tablet  Commonly known as:  ULTRAM            Indwelling Lines/Drains at time of discharge:   Lines/Drains/Airways          None          Time spent on the discharge of patient: 30 minutes  Patient was seen and examined on the date of discharge and determined to be suitable for discharge.         Amanda Vallejo DO  Department of Hospital Medicine  [facility]

## 2018-10-23 NOTE — PLAN OF CARE
Problem: Physical Therapy Goal  Goal: Physical Therapy Goal  PT goals until 10/30/18    1. Pt supine to sit with SBA-not met  2. Pt sit to supine with supervision-not met  3. Pt sit to stand with min A with handheld assist on L side-not met  4. Pt to perform gait 20 feet with mod A with hemiwalker.-not met  5. Pt to propel w/c 100 feet with SBA with B UEs.-not met  6. Pt to perform B LE exs in sitting or supine x 15 reps to strengthen B LE to improve functional mobility.-not met    Outcome: Ongoing (interventions implemented as appropriate)  Pt's goals set appropriate and pt will benefit from skilled PT services to work towards improved functional mobility including: bed mobility, transfers, wheelchair propulsion, and gait.     Dov Sinclair, SPT  10/23/2018  I certify that I was present in the room directing the student in service delivery and guiding them using my skilled judgment. As the co-signing therapist I have reviewed the students documentation and am responsible for the treatment, assessment, and plan.     Christen Fay PT 10/23/18

## 2018-10-23 NOTE — PT/OT/SLP EVAL
Physical Therapy Evaluation    Patient Name:  Neri Regan   MRN:  [id number]    Recommendations:     Discharge Recommendations:  nursing facility, skilled   Discharge Equipment Recommendations: none   Barriers to discharge: Decreased caregiver support    Assessment:     Neri Regan is a 63 y.o. male admitted with a medical diagnosis of Aspiration pneumonitis.  He presents with the following impairments/functional limitations:  weakness, impaired endurance, impaired self care skills, impaired balance, gait instability, decreased coordination, abnormal tone, decreased ROM. Pt in good spirits and motivated for therapy. He requires mod A for bed mobility and transfers. Pt ambulated 3 trials of 15 feet each, using R side wall rail and mod A to support hemiparetic L side. Pt stated therapists at [facility] usually use L knee brace to lock into extension but he does not have this with him at [facility]. 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    Rehab Prognosis:  good; patient would benefit from acute skilled PT services to address these deficits and reach maximum level of function.      Recent Surgery: * No surgery found *      Plan:     During this hospitalization, patient to be seen 4 x/week to address the above listed problems via gait training, therapeutic activities, therapeutic exercises, neuromuscular re-education, wheelchair management/training.  · Plan of Care Expires:  11/22/18   Plan of Care Reviewed with: patient    Subjective     Communicated with nurse prior to session.  Patient found supine upon PT entry to room, agreeable to evaluation.      Chief Complaint: N/A  Patient comments/goals: to return to PLOF  Pain/Comfort:  · Pain Rating 1: 0/10    Patients cultural, spiritual, Adventism conflicts given the current situation: none noted    Living Environment:  Pt is a resident of [facility], where he was receiving therapy daily. Prior to admission, patients level of function was assist  with ADLs and mobility. Pt states he was able to self-propel w/c within home and t/f to toilet without assistance. Patient has the following equipment: wheelchair, grab bar. Upon discharge, patient will have assistance from nursing home staff.    Objective:     Patient found with: SCD, peripheral IV     General Precautions: Standard, aspiration, fall   Orthopedic Precautions:N/A   Braces:       Exams:  · Cognitive Exam:  Patient is oriented to Person, Place and Situation  · Sensation:    · -       Intact  light/touch B LEs  · RLE ROM: WFL  · RLE Strength: grossly 4+/5 throughout  · LLE ROM: Deficits: knee flexion/extension mildly impaired due to increased tone throughout L LE (secondary to CVA)  · LLE Strength: hip flexion 4-/5, knee extension 2+/5, knee flexion 3-/5, ankle dorsiflexion at best 2+/5 (hemiparesis secondary to CVA)    Functional Mobility:  · Bed Mobility:     · Rolling Right: moderate assistance with HHA  · Supine to Sit: moderate assistance  · Sit to Supine: stand by assistance  · Transfers:     · Sit to Stand:  moderate assistance with no AD  · Bed to Chair: moderate assistance with  no AD  using  Stand Pivot  · Gait: 15 feet x 3 trials with moderate assistance with pt using wall rail on R side in mcdonald throughout. Pt requires manual cues to direct the L foot placement during swing (pt tends to circumduct and place L foot too close to R foot) and manual cues at L knee anteriorly during stance to facilitate knee ext. Ace wrap applied to L LE to keep pt's foot in dorsiflexion to help clear limb during swing.     AM-PAC 6 CLICK MOBILITY  Total Score:15     Patient left supine with call button in reach and nurse notified.    GOALS:   Multidisciplinary Problems     Physical Therapy Goals        Problem: Physical Therapy Goal    Goal Priority Disciplines Outcome Goal Variances Interventions   Physical Therapy Goal     PT, PT/OT Ongoing (interventions implemented as appropriate)     Description:  PT goals  until 10/30/18    1. Pt supine to sit with SBA-not met  2. Pt sit to supine with supervision-not met  3. Pt sit to stand with min A with handheld assist on L side-not met  4. Pt to perform gait 20 feet with mod A with hemiwalker.-not met  5. Pt to propel w/c 100 feet with SBA with R UE and R LE.-not met  6. Pt to perform B LE exs in sitting or supine x 15 reps to strengthen B LE to improve functional mobility.-not met                       History:     Past Medical History:   Diagnosis Date    Hyperlipidemia     Hypertension     Stroke        Past Surgical History:   Procedure Laterality Date    ABDOMINAL SURGERY N/A     Piece of metal lodged in abdomen - workplace injury, surgical removal    CHOLECYSTECTOMY      STOMACH SURGERY         Clinical Decision Making:     History  Co-morbidities and personal factors that may impact the plan of care Examination  Body Structures and Functions, activity limitations and participation restrictions that may impact the plan of care Clinical Presentation   Decision Making/ Complexity Score   Co-morbidities:   [] Time since onset of injury / illness / exacerbation  [] Status of current condition  []Patient's cognitive status and safety concerns    [] Multiple Medical Problems (see med hx)  Personal Factors:   [] Patient's age  [x] Prior Level of function   [x] Patient's home situation (environment and family support)  [] Patient's level of motivation  [] Expected progression of patient      HISTORY:(criteria)    [] 94339 - no personal factors/history    [x] 97569 - has 1-2 personal factor/comorbidity     [] 22330 - has >3 personal factor/comorbidity     Body Regions:  [] Objective examination findings  [] Head     []  Neck  [] Trunk   [] Upper Extremity  [] Lower Extremity    Body Systems:  [] For communication ability, affect, cognition, language, and learning style: the assessment of the ability to make needs known, consciousness, orientation (person, place, and time),  expected emotional /behavioral responses, and learning preferences (eg, learning barriers, education  needs)  [x] For the neuromuscular system: a general assessment of gross coordinated movement (eg, balance, gait, locomotion, transfers, and transitions) and motor function  (motor control and motor learning)  [x] For the musculoskeletal system: the assessment of gross symmetry, gross range of motion, gross strength, height, and weight  [] For the integumentary system: the assessment of pliability(texture), presence of scar formation, skin color, and skin integrity  [] For cardiovascular/pulmonary system: the assessment of heart rate, respiratory rate, blood pressure, and edema     Activity limitations:    [] Patient's cognitive status and saf ety concerns          [] Status of current condition      [] Weight bearing restriction  [] Cardiopulmunary Restriction    Participation Restrictions:   [] Goals and goal agreement with the patient     [] Rehab potential (prognosis) and probable outcome      Examination of Body System: (criteria)    [x] 29073 - addressing 1-2 elements    [] 00044 - addressing a total of 3 or more elements     [] 81988 -  Addressing a total of 4 or more elements         Clinical Presentation: (criteria)  Stable - 87771     On examination of body system using standardized tests and measures patient presents with (CHOOSE ONE) elements from any of the following: body structures and functions, activity limitations, and/or participation restrictions.  Leading to a clinical presentation that is considered (CHOOSE ONE)                              Clinical Decision Making  (Eval Complexity):  Low- 83882     Time Tracking:     PT Received On: 10/23/18  PT Start Time: 0930     PT Stop Time: 1005  PT Total Time (min): 35 min     Billable Minutes: Evaluation 15 and Gait Training 20      TRINA Tanner  10/23/2018   I certify that I was present in the room directing the student in service delivery  and guiding them using my skilled judgment. As the co-signing therapist I have reviewed the students documentation and am responsible for the treatment, assessment, and plan.     Christen Fay PT 10/23/18

## 2018-10-23 NOTE — PLAN OF CARE
[facility]     Department of Hospital Medicine     [address] [place] [address]     [phone] after hours  [phone] fax       NURSING HOME ORDERS    10/23/2018    Admit to Nursing Home:  Regular Bed        Diagnoses:  Active Hospital Problems    Diagnosis  POA    *Aspiration pneumonitis [J69.0]  Yes    Fever [R50.9]  Yes    Type 2 diabetes mellitus, without long-term current use of insulin [E11.9]  Yes    History of stroke with residual deficit [I69.30]  Not Applicable    Essential hypertension [I10]  Yes    Diarrhea [R19.7]  Yes    Non-intractable vomiting with nausea [R11.2]  Yes      Resolved Hospital Problems   No resolved problems to display.       Patient is homebound due to:  Aspiration pneumonitis    Allergies:Review of patient's allergies indicates:  No Known Allergies    Vitals:      Routine, once monthly    Diet: regular    Acitivities:    - Up in a chair each morning as tolerated   - Ambulate with assistance to bathroom   - Scheduled walks once each shift (every 8 hours)   - May use walker, cane, or self-propelled wheelchair    LABS:  Per facility protocol    Nursing Precautions:   - Aspiration precautions:             - Total assistance with meals            -  Upright 90 degrees befor during and after meals             -  Suction at bedside          - Fall precautions per nursing home protocol            Medications: Discontinue all previous medication orders, if any. See new list below.     Neri Regan   Home Medication Instructions KAVYA:[id number]    Printed on:10/23/18 1001   Medication Information                      acetaminophen (TYLENOL) 500 MG tablet  Take 1,000 mg by mouth every 8 (eight) hours as needed for Pain.             aspirin (ECOTRIN) 81 MG EC tablet  Take 81 mg by mouth once daily.             atorvastatin (LIPITOR) 40 MG tablet  Take 40 mg by mouth every evening.             fluticasone (FLONASE) 50 mcg/actuation  nasal spray  1 spray by Each Nare route once daily.             guaifenesin 100 mg/5 ml (ROBITUSSIN) 100 mg/5 mL syrup  Take 200 mg by mouth every 6 (six) hours as needed for Cough.             hydrocortisone 1 % cream  Apply to rash on back twice daily until resolved             ibuprofen (ADVIL,MOTRIN) 400 MG tablet  Take 400 mg by mouth daily as needed (pain).             loratadine (CLARITIN) 10 mg tablet  Take 10 mg by mouth once daily.             losartan (COZAAR) 50 MG tablet  Take 50 mg by mouth once daily.             menthol/camphor (ICY HOT ADVANCED RELIEF TOP)  Apply every other night             nystatin (MYCOSTATIN) cream  Apply to yeast rash on buttocks twice daily             pantoprazole (PROTONIX) 20 MG tablet  Take 1 tablet (20 mg total) by mouth once daily. for 3 days             predniSONE (DELTASONE) 20 MG tablet  Take 2 tablets (40 mg total) by mouth once daily. for 3 days                                  _________________________________  Amanda Vallejo DO  10/23/2018

## 2018-10-23 NOTE — PLAN OF CARE
Patient discharging to [facility], notified nurse to call report, notified SW to arrange wheelchair van, will follow for additional needs.

## 2018-10-24 LAB — L PNEUMO AG UR QL IA: NOT DETECTED

## 2018-10-26 LAB
BACTERIA BLD CULT: NORMAL
BACTERIA BLD CULT: NORMAL

## 2018-10-26 NOTE — PROGRESS NOTES
Physical Therapy Discharge Summary    Name: Neri Regan  MRN: [id number]   Principal Problem: Aspiration pneumonitis     Patient Discharged from acute Physical Therapy on 10/23/18.  Please refer to prior PT noted date on 10/23/18 for functional status.     Assessment:     Patient appropriate for care in another setting.    Objective:     GOALS:   Multidisciplinary Problems     Physical Therapy Goals        Problem: Physical Therapy Goal    Goal Priority Disciplines Outcome Goal Variances Interventions   Physical Therapy Goal     PT, PT/OT Ongoing (interventions implemented as appropriate)     Description:  PT goals until 10/30/18    1. Pt supine to sit with SBA-not met  2. Pt sit to supine with supervision-not met  3. Pt sit to stand with min A with handheld assist on L side-not met  4. Pt to perform gait 20 feet with mod A with hemiwalker.-not met  5. Pt to propel w/c 100 feet with SBA with R UE and R LE.-not met  6. Pt to perform B LE exs in sitting or supine x 15 reps to strengthen B LE to improve functional mobility.-not met                       Reasons for Discontinuation of Therapy Services  Transfer to alternate level of care.      Plan:     Patient Discharged to: Nursing Home.    Chritsen Fay, PT  10/26/2018

## 2020-05-28 ENCOUNTER — LAB VISIT (OUTPATIENT)
Dept: LAB | Facility: HOSPITAL | Age: 65
End: 2020-05-28
Attending: EMERGENCY MEDICINE
Payer: MEDICAID

## 2020-05-28 DIAGNOSIS — Z01.84 ANTIBODY RESPONSE EXAMINATION: ICD-10-CM

## 2020-05-28 PROCEDURE — 86769 SARS-COV-2 COVID-19 ANTIBODY: CPT

## 2020-05-28 PROCEDURE — 36415 COLL VENOUS BLD VENIPUNCTURE: CPT

## 2020-05-29 LAB — SARS-COV-2 IGG SERPLBLD QL IA.RAPID: NEGATIVE

## 2020-06-12 ENCOUNTER — LAB VISIT (OUTPATIENT)
Dept: LAB | Facility: OTHER | Age: 65
End: 2020-06-12
Payer: MEDICAID

## 2020-06-12 DIAGNOSIS — Z20.822 SUSPECTED COVID-19 VIRUS INFECTION: ICD-10-CM

## 2020-06-12 PROCEDURE — U0003 INFECTIOUS AGENT DETECTION BY NUCLEIC ACID (DNA OR RNA); SEVERE ACUTE RESPIRATORY SYNDROME CORONAVIRUS 2 (SARS-COV-2) (CORONAVIRUS DISEASE [COVID-19]), AMPLIFIED PROBE TECHNIQUE, MAKING USE OF HIGH THROUGHPUT TECHNOLOGIES AS DESCRIBED BY CMS-2020-01-R: HCPCS | Mod: ST72

## 2020-06-16 LAB — SARS-COV-2 RNA RESP QL NAA+PROBE: NOT DETECTED

## 2020-06-19 ENCOUNTER — LAB VISIT (OUTPATIENT)
Dept: LAB | Facility: OTHER | Age: 65
End: 2020-06-19
Payer: MEDICAID

## 2020-06-19 DIAGNOSIS — Z20.822 SUSPECTED COVID-19 VIRUS INFECTION: ICD-10-CM

## 2020-06-19 PROCEDURE — U0003 INFECTIOUS AGENT DETECTION BY NUCLEIC ACID (DNA OR RNA); SEVERE ACUTE RESPIRATORY SYNDROME CORONAVIRUS 2 (SARS-COV-2) (CORONAVIRUS DISEASE [COVID-19]), AMPLIFIED PROBE TECHNIQUE, MAKING USE OF HIGH THROUGHPUT TECHNOLOGIES AS DESCRIBED BY CMS-2020-01-R: HCPCS

## 2020-06-20 LAB — SARS-COV-2 RNA RESP QL NAA+PROBE: NOT DETECTED

## 2020-06-26 ENCOUNTER — LAB VISIT (OUTPATIENT)
Dept: LAB | Facility: OTHER | Age: 65
End: 2020-06-26
Payer: MEDICAID

## 2020-06-26 DIAGNOSIS — Z20.822 SUSPECTED COVID-19 VIRUS INFECTION: ICD-10-CM

## 2020-06-26 PROCEDURE — U0003 INFECTIOUS AGENT DETECTION BY NUCLEIC ACID (DNA OR RNA); SEVERE ACUTE RESPIRATORY SYNDROME CORONAVIRUS 2 (SARS-COV-2) (CORONAVIRUS DISEASE [COVID-19]), AMPLIFIED PROBE TECHNIQUE, MAKING USE OF HIGH THROUGHPUT TECHNOLOGIES AS DESCRIBED BY CMS-2020-01-R: HCPCS | Mod: ST72

## 2020-07-01 LAB — SARS-COV-2 RNA RESP QL NAA+PROBE: NOT DETECTED

## 2020-07-03 ENCOUNTER — LAB VISIT (OUTPATIENT)
Dept: LAB | Facility: OTHER | Age: 65
End: 2020-07-03
Attending: INTERNAL MEDICINE
Payer: MEDICAID

## 2020-07-03 DIAGNOSIS — Z20.822 SUSPECTED COVID-19 VIRUS INFECTION: ICD-10-CM

## 2020-07-03 PROCEDURE — U0003 INFECTIOUS AGENT DETECTION BY NUCLEIC ACID (DNA OR RNA); SEVERE ACUTE RESPIRATORY SYNDROME CORONAVIRUS 2 (SARS-COV-2) (CORONAVIRUS DISEASE [COVID-19]), AMPLIFIED PROBE TECHNIQUE, MAKING USE OF HIGH THROUGHPUT TECHNOLOGIES AS DESCRIBED BY CMS-2020-01-R: HCPCS | Mod: ST72

## 2020-07-08 LAB — SARS-COV-2 RNA RESP QL NAA+PROBE: NOT DETECTED

## 2020-07-10 ENCOUNTER — LAB VISIT (OUTPATIENT)
Dept: LAB | Facility: OTHER | Age: 65
End: 2020-07-10
Payer: MEDICAID

## 2020-07-10 DIAGNOSIS — Z20.822 SUSPECTED COVID-19 VIRUS INFECTION: ICD-10-CM

## 2020-07-10 PROCEDURE — U0003 INFECTIOUS AGENT DETECTION BY NUCLEIC ACID (DNA OR RNA); SEVERE ACUTE RESPIRATORY SYNDROME CORONAVIRUS 2 (SARS-COV-2) (CORONAVIRUS DISEASE [COVID-19]), AMPLIFIED PROBE TECHNIQUE, MAKING USE OF HIGH THROUGHPUT TECHNOLOGIES AS DESCRIBED BY CMS-2020-01-R: HCPCS

## 2020-07-14 LAB — SARS-COV-2 RNA RESP QL NAA+PROBE: NEGATIVE

## 2020-07-17 ENCOUNTER — LAB VISIT (OUTPATIENT)
Dept: LAB | Facility: OTHER | Age: 65
End: 2020-07-17
Payer: MEDICAID

## 2020-07-17 DIAGNOSIS — Z20.822 SUSPECTED COVID-19 VIRUS INFECTION: ICD-10-CM

## 2020-07-17 DIAGNOSIS — Z03.818 ENCOUNTER FOR OBSERVATION FOR SUSPECTED EXPOSURE TO OTHER BIOLOGICAL AGENTS RULED OUT: ICD-10-CM

## 2020-07-17 PROCEDURE — U0003 INFECTIOUS AGENT DETECTION BY NUCLEIC ACID (DNA OR RNA); SEVERE ACUTE RESPIRATORY SYNDROME CORONAVIRUS 2 (SARS-COV-2) (CORONAVIRUS DISEASE [COVID-19]), AMPLIFIED PROBE TECHNIQUE, MAKING USE OF HIGH THROUGHPUT TECHNOLOGIES AS DESCRIBED BY CMS-2020-01-R: HCPCS

## 2020-07-22 LAB — SARS-COV-2 RNA RESP QL NAA+PROBE: NEGATIVE

## 2020-08-07 ENCOUNTER — LAB VISIT (OUTPATIENT)
Dept: LAB | Facility: OTHER | Age: 65
End: 2020-08-07
Payer: MEDICAID

## 2020-08-07 DIAGNOSIS — Z03.818 ENCOUNTER FOR OBSERVATION FOR SUSPECTED EXPOSURE TO OTHER BIOLOGICAL AGENTS RULED OUT: ICD-10-CM

## 2020-08-07 PROCEDURE — U0003 INFECTIOUS AGENT DETECTION BY NUCLEIC ACID (DNA OR RNA); SEVERE ACUTE RESPIRATORY SYNDROME CORONAVIRUS 2 (SARS-COV-2) (CORONAVIRUS DISEASE [COVID-19]), AMPLIFIED PROBE TECHNIQUE, MAKING USE OF HIGH THROUGHPUT TECHNOLOGIES AS DESCRIBED BY CMS-2020-01-R: HCPCS

## 2020-08-12 LAB — SARS-COV-2 RNA RESP QL NAA+PROBE: NORMAL

## 2020-08-14 ENCOUNTER — LAB VISIT (OUTPATIENT)
Dept: LAB | Facility: OTHER | Age: 65
End: 2020-08-14
Payer: MEDICAID

## 2020-08-14 DIAGNOSIS — Z03.818 ENCOUNTER FOR OBSERVATION FOR SUSPECTED EXPOSURE TO OTHER BIOLOGICAL AGENTS RULED OUT: ICD-10-CM

## 2020-08-14 PROCEDURE — U0003 INFECTIOUS AGENT DETECTION BY NUCLEIC ACID (DNA OR RNA); SEVERE ACUTE RESPIRATORY SYNDROME CORONAVIRUS 2 (SARS-COV-2) (CORONAVIRUS DISEASE [COVID-19]), AMPLIFIED PROBE TECHNIQUE, MAKING USE OF HIGH THROUGHPUT TECHNOLOGIES AS DESCRIBED BY CMS-2020-01-R: HCPCS

## 2020-08-16 LAB — SARS-COV-2 RNA RESP QL NAA+PROBE: NOT DETECTED

## 2020-08-21 ENCOUNTER — LAB VISIT (OUTPATIENT)
Dept: LAB | Facility: OTHER | Age: 65
End: 2020-08-21
Payer: MEDICAID

## 2020-08-21 DIAGNOSIS — Z03.818 ENCOUNTER FOR OBSERVATION FOR SUSPECTED EXPOSURE TO OTHER BIOLOGICAL AGENTS RULED OUT: ICD-10-CM

## 2020-08-21 PROCEDURE — U0003 INFECTIOUS AGENT DETECTION BY NUCLEIC ACID (DNA OR RNA); SEVERE ACUTE RESPIRATORY SYNDROME CORONAVIRUS 2 (SARS-COV-2) (CORONAVIRUS DISEASE [COVID-19]), AMPLIFIED PROBE TECHNIQUE, MAKING USE OF HIGH THROUGHPUT TECHNOLOGIES AS DESCRIBED BY CMS-2020-01-R: HCPCS

## 2020-08-23 LAB — SARS-COV-2 RNA RESP QL NAA+PROBE: NOT DETECTED

## 2020-08-28 ENCOUNTER — LAB VISIT (OUTPATIENT)
Dept: LAB | Facility: OTHER | Age: 65
End: 2020-08-28
Payer: MEDICAID

## 2020-08-28 DIAGNOSIS — Z03.818 ENCOUNTER FOR OBSERVATION FOR SUSPECTED EXPOSURE TO OTHER BIOLOGICAL AGENTS RULED OUT: ICD-10-CM

## 2020-08-28 PROCEDURE — U0003 INFECTIOUS AGENT DETECTION BY NUCLEIC ACID (DNA OR RNA); SEVERE ACUTE RESPIRATORY SYNDROME CORONAVIRUS 2 (SARS-COV-2) (CORONAVIRUS DISEASE [COVID-19]), AMPLIFIED PROBE TECHNIQUE, MAKING USE OF HIGH THROUGHPUT TECHNOLOGIES AS DESCRIBED BY CMS-2020-01-R: HCPCS

## 2020-08-29 LAB — SARS-COV-2 RNA RESP QL NAA+PROBE: NOT DETECTED

## 2020-09-04 ENCOUNTER — LAB VISIT (OUTPATIENT)
Dept: LAB | Facility: OTHER | Age: 65
End: 2020-09-04
Attending: INTERNAL MEDICINE
Payer: MEDICAID

## 2020-09-04 DIAGNOSIS — Z03.818 ENCOUNTER FOR OBSERVATION FOR SUSPECTED EXPOSURE TO OTHER BIOLOGICAL AGENTS RULED OUT: ICD-10-CM

## 2020-09-04 PROCEDURE — U0003 INFECTIOUS AGENT DETECTION BY NUCLEIC ACID (DNA OR RNA); SEVERE ACUTE RESPIRATORY SYNDROME CORONAVIRUS 2 (SARS-COV-2) (CORONAVIRUS DISEASE [COVID-19]), AMPLIFIED PROBE TECHNIQUE, MAKING USE OF HIGH THROUGHPUT TECHNOLOGIES AS DESCRIBED BY CMS-2020-01-R: HCPCS

## 2020-09-05 LAB — SARS-COV-2 RNA RESP QL NAA+PROBE: NOT DETECTED

## 2020-09-11 ENCOUNTER — LAB VISIT (OUTPATIENT)
Dept: LAB | Facility: OTHER | Age: 65
End: 2020-09-11
Payer: MEDICAID

## 2020-09-11 DIAGNOSIS — Z03.818 ENCOUNTER FOR OBSERVATION FOR SUSPECTED EXPOSURE TO OTHER BIOLOGICAL AGENTS RULED OUT: ICD-10-CM

## 2020-09-11 PROCEDURE — U0003 INFECTIOUS AGENT DETECTION BY NUCLEIC ACID (DNA OR RNA); SEVERE ACUTE RESPIRATORY SYNDROME CORONAVIRUS 2 (SARS-COV-2) (CORONAVIRUS DISEASE [COVID-19]), AMPLIFIED PROBE TECHNIQUE, MAKING USE OF HIGH THROUGHPUT TECHNOLOGIES AS DESCRIBED BY CMS-2020-01-R: HCPCS

## 2020-09-12 LAB — SARS-COV-2 RNA RESP QL NAA+PROBE: NOT DETECTED

## 2020-09-18 ENCOUNTER — LAB VISIT (OUTPATIENT)
Dept: LAB | Facility: OTHER | Age: 65
End: 2020-09-18
Payer: MEDICAID

## 2020-09-18 DIAGNOSIS — Z03.818 ENCOUNTER FOR OBSERVATION FOR SUSPECTED EXPOSURE TO OTHER BIOLOGICAL AGENTS RULED OUT: ICD-10-CM

## 2020-09-18 PROCEDURE — U0003 INFECTIOUS AGENT DETECTION BY NUCLEIC ACID (DNA OR RNA); SEVERE ACUTE RESPIRATORY SYNDROME CORONAVIRUS 2 (SARS-COV-2) (CORONAVIRUS DISEASE [COVID-19]), AMPLIFIED PROBE TECHNIQUE, MAKING USE OF HIGH THROUGHPUT TECHNOLOGIES AS DESCRIBED BY CMS-2020-01-R: HCPCS

## 2020-09-20 LAB — SARS-COV-2 RNA RESP QL NAA+PROBE: NOT DETECTED

## 2020-10-16 ENCOUNTER — LAB VISIT (OUTPATIENT)
Dept: LAB | Facility: OTHER | Age: 65
End: 2020-10-16
Payer: MEDICAID

## 2020-10-16 DIAGNOSIS — Z03.818 ENCOUNTER FOR OBSERVATION FOR SUSPECTED EXPOSURE TO OTHER BIOLOGICAL AGENTS RULED OUT: ICD-10-CM

## 2020-10-16 PROCEDURE — U0003 INFECTIOUS AGENT DETECTION BY NUCLEIC ACID (DNA OR RNA); SEVERE ACUTE RESPIRATORY SYNDROME CORONAVIRUS 2 (SARS-COV-2) (CORONAVIRUS DISEASE [COVID-19]), AMPLIFIED PROBE TECHNIQUE, MAKING USE OF HIGH THROUGHPUT TECHNOLOGIES AS DESCRIBED BY CMS-2020-01-R: HCPCS

## 2020-10-17 LAB — SARS-COV-2 RNA RESP QL NAA+PROBE: NOT DETECTED

## 2020-10-23 ENCOUNTER — LAB VISIT (OUTPATIENT)
Dept: LAB | Facility: OTHER | Age: 65
End: 2020-10-23
Payer: MEDICAID

## 2020-10-23 DIAGNOSIS — Z03.818 ENCOUNTER FOR OBSERVATION FOR SUSPECTED EXPOSURE TO OTHER BIOLOGICAL AGENTS RULED OUT: ICD-10-CM

## 2020-10-23 PROCEDURE — U0003 INFECTIOUS AGENT DETECTION BY NUCLEIC ACID (DNA OR RNA); SEVERE ACUTE RESPIRATORY SYNDROME CORONAVIRUS 2 (SARS-COV-2) (CORONAVIRUS DISEASE [COVID-19]), AMPLIFIED PROBE TECHNIQUE, MAKING USE OF HIGH THROUGHPUT TECHNOLOGIES AS DESCRIBED BY CMS-2020-01-R: HCPCS

## 2020-10-25 LAB — SARS-COV-2 RNA RESP QL NAA+PROBE: NOT DETECTED

## 2020-10-30 ENCOUNTER — LAB VISIT (OUTPATIENT)
Dept: LAB | Facility: OTHER | Age: 65
End: 2020-10-30
Payer: MEDICAID

## 2020-10-30 DIAGNOSIS — Z03.818 ENCOUNTER FOR OBSERVATION FOR SUSPECTED EXPOSURE TO OTHER BIOLOGICAL AGENTS RULED OUT: ICD-10-CM

## 2020-10-30 PROCEDURE — U0003 INFECTIOUS AGENT DETECTION BY NUCLEIC ACID (DNA OR RNA); SEVERE ACUTE RESPIRATORY SYNDROME CORONAVIRUS 2 (SARS-COV-2) (CORONAVIRUS DISEASE [COVID-19]), AMPLIFIED PROBE TECHNIQUE, MAKING USE OF HIGH THROUGHPUT TECHNOLOGIES AS DESCRIBED BY CMS-2020-01-R: HCPCS

## 2020-10-31 LAB — SARS-COV-2 RNA RESP QL NAA+PROBE: NOT DETECTED

## 2020-12-18 ENCOUNTER — LAB VISIT (OUTPATIENT)
Dept: LAB | Facility: OTHER | Age: 65
End: 2020-12-18
Payer: MEDICAID

## 2020-12-18 DIAGNOSIS — Z03.818 ENCOUNTER FOR OBSERVATION FOR SUSPECTED EXPOSURE TO OTHER BIOLOGICAL AGENTS RULED OUT: ICD-10-CM

## 2020-12-18 PROCEDURE — U0003 INFECTIOUS AGENT DETECTION BY NUCLEIC ACID (DNA OR RNA); SEVERE ACUTE RESPIRATORY SYNDROME CORONAVIRUS 2 (SARS-COV-2) (CORONAVIRUS DISEASE [COVID-19]), AMPLIFIED PROBE TECHNIQUE, MAKING USE OF HIGH THROUGHPUT TECHNOLOGIES AS DESCRIBED BY CMS-2020-01-R: HCPCS

## 2020-12-20 LAB — SARS-COV-2 RNA RESP QL NAA+PROBE: NOT DETECTED

## 2020-12-23 ENCOUNTER — LAB VISIT (OUTPATIENT)
Dept: LAB | Facility: OTHER | Age: 65
End: 2020-12-23
Payer: MEDICAID

## 2020-12-23 DIAGNOSIS — Z03.818 ENCOUNTER FOR OBSERVATION FOR SUSPECTED EXPOSURE TO OTHER BIOLOGICAL AGENTS RULED OUT: ICD-10-CM

## 2020-12-23 PROCEDURE — U0003 INFECTIOUS AGENT DETECTION BY NUCLEIC ACID (DNA OR RNA); SEVERE ACUTE RESPIRATORY SYNDROME CORONAVIRUS 2 (SARS-COV-2) (CORONAVIRUS DISEASE [COVID-19]), AMPLIFIED PROBE TECHNIQUE, MAKING USE OF HIGH THROUGHPUT TECHNOLOGIES AS DESCRIBED BY CMS-2020-01-R: HCPCS

## 2020-12-25 LAB — SARS-COV-2 RNA RESP QL NAA+PROBE: NOT DETECTED

## 2020-12-30 ENCOUNTER — LAB VISIT (OUTPATIENT)
Dept: LAB | Facility: OTHER | Age: 65
End: 2020-12-30
Payer: MEDICAID

## 2020-12-30 DIAGNOSIS — Z03.818 ENCOUNTER FOR OBSERVATION FOR SUSPECTED EXPOSURE TO OTHER BIOLOGICAL AGENTS RULED OUT: ICD-10-CM

## 2020-12-30 PROCEDURE — U0003 INFECTIOUS AGENT DETECTION BY NUCLEIC ACID (DNA OR RNA); SEVERE ACUTE RESPIRATORY SYNDROME CORONAVIRUS 2 (SARS-COV-2) (CORONAVIRUS DISEASE [COVID-19]), AMPLIFIED PROBE TECHNIQUE, MAKING USE OF HIGH THROUGHPUT TECHNOLOGIES AS DESCRIBED BY CMS-2020-01-R: HCPCS

## 2020-12-31 LAB — SARS-COV-2 RNA RESP QL NAA+PROBE: NOT DETECTED

## 2021-01-04 ENCOUNTER — LAB VISIT (OUTPATIENT)
Dept: LAB | Facility: OTHER | Age: 66
End: 2021-01-04
Payer: MEDICAID

## 2021-01-04 DIAGNOSIS — Z03.818 ENCOUNTER FOR OBSERVATION FOR SUSPECTED EXPOSURE TO OTHER BIOLOGICAL AGENTS RULED OUT: ICD-10-CM

## 2021-01-04 PROCEDURE — U0003 INFECTIOUS AGENT DETECTION BY NUCLEIC ACID (DNA OR RNA); SEVERE ACUTE RESPIRATORY SYNDROME CORONAVIRUS 2 (SARS-COV-2) (CORONAVIRUS DISEASE [COVID-19]), AMPLIFIED PROBE TECHNIQUE, MAKING USE OF HIGH THROUGHPUT TECHNOLOGIES AS DESCRIBED BY CMS-2020-01-R: HCPCS

## 2021-01-06 LAB — SARS-COV-2 RNA RESP QL NAA+PROBE: NOT DETECTED

## 2021-01-07 ENCOUNTER — LAB VISIT (OUTPATIENT)
Dept: LAB | Facility: OTHER | Age: 66
End: 2021-01-07
Payer: MEDICAID

## 2021-01-07 DIAGNOSIS — Z03.818 ENCOUNTER FOR OBSERVATION FOR SUSPECTED EXPOSURE TO OTHER BIOLOGICAL AGENTS RULED OUT: ICD-10-CM

## 2021-01-07 PROCEDURE — U0003 INFECTIOUS AGENT DETECTION BY NUCLEIC ACID (DNA OR RNA); SEVERE ACUTE RESPIRATORY SYNDROME CORONAVIRUS 2 (SARS-COV-2) (CORONAVIRUS DISEASE [COVID-19]), AMPLIFIED PROBE TECHNIQUE, MAKING USE OF HIGH THROUGHPUT TECHNOLOGIES AS DESCRIBED BY CMS-2020-01-R: HCPCS

## 2021-01-10 LAB — SARS-COV-2 RNA RESP QL NAA+PROBE: NOT DETECTED

## 2021-01-11 ENCOUNTER — LAB VISIT (OUTPATIENT)
Dept: LAB | Facility: OTHER | Age: 66
End: 2021-01-11
Payer: MEDICAID

## 2021-01-11 DIAGNOSIS — Z03.818 ENCOUNTER FOR OBSERVATION FOR SUSPECTED EXPOSURE TO OTHER BIOLOGICAL AGENTS RULED OUT: ICD-10-CM

## 2021-01-11 PROCEDURE — U0003 INFECTIOUS AGENT DETECTION BY NUCLEIC ACID (DNA OR RNA); SEVERE ACUTE RESPIRATORY SYNDROME CORONAVIRUS 2 (SARS-COV-2) (CORONAVIRUS DISEASE [COVID-19]), AMPLIFIED PROBE TECHNIQUE, MAKING USE OF HIGH THROUGHPUT TECHNOLOGIES AS DESCRIBED BY CMS-2020-01-R: HCPCS

## 2021-01-12 LAB — SARS-COV-2 RNA RESP QL NAA+PROBE: NOT DETECTED

## 2021-01-14 ENCOUNTER — LAB VISIT (OUTPATIENT)
Dept: LAB | Facility: OTHER | Age: 66
End: 2021-01-14
Payer: MEDICAID

## 2021-01-14 DIAGNOSIS — Z20.822 ENCOUNTER FOR LABORATORY TESTING FOR COVID-19 VIRUS: ICD-10-CM

## 2021-01-14 PROCEDURE — U0003 INFECTIOUS AGENT DETECTION BY NUCLEIC ACID (DNA OR RNA); SEVERE ACUTE RESPIRATORY SYNDROME CORONAVIRUS 2 (SARS-COV-2) (CORONAVIRUS DISEASE [COVID-19]), AMPLIFIED PROBE TECHNIQUE, MAKING USE OF HIGH THROUGHPUT TECHNOLOGIES AS DESCRIBED BY CMS-2020-01-R: HCPCS

## 2021-01-15 LAB — SARS-COV-2 RNA RESP QL NAA+PROBE: NOT DETECTED

## 2021-01-18 ENCOUNTER — LAB VISIT (OUTPATIENT)
Dept: LAB | Facility: OTHER | Age: 66
End: 2021-01-18
Payer: MEDICAID

## 2021-01-18 DIAGNOSIS — Z20.822 ENCOUNTER FOR LABORATORY TESTING FOR COVID-19 VIRUS: ICD-10-CM

## 2021-01-18 PROCEDURE — U0003 INFECTIOUS AGENT DETECTION BY NUCLEIC ACID (DNA OR RNA); SEVERE ACUTE RESPIRATORY SYNDROME CORONAVIRUS 2 (SARS-COV-2) (CORONAVIRUS DISEASE [COVID-19]), AMPLIFIED PROBE TECHNIQUE, MAKING USE OF HIGH THROUGHPUT TECHNOLOGIES AS DESCRIBED BY CMS-2020-01-R: HCPCS

## 2021-01-19 LAB — SARS-COV-2 RNA RESP QL NAA+PROBE: NOT DETECTED

## 2021-01-21 ENCOUNTER — LAB VISIT (OUTPATIENT)
Dept: LAB | Facility: OTHER | Age: 66
End: 2021-01-21
Payer: MEDICAID

## 2021-01-21 DIAGNOSIS — Z20.822 ENCOUNTER FOR LABORATORY TESTING FOR COVID-19 VIRUS: ICD-10-CM

## 2021-01-21 PROCEDURE — U0003 INFECTIOUS AGENT DETECTION BY NUCLEIC ACID (DNA OR RNA); SEVERE ACUTE RESPIRATORY SYNDROME CORONAVIRUS 2 (SARS-COV-2) (CORONAVIRUS DISEASE [COVID-19]), AMPLIFIED PROBE TECHNIQUE, MAKING USE OF HIGH THROUGHPUT TECHNOLOGIES AS DESCRIBED BY CMS-2020-01-R: HCPCS

## 2021-01-22 LAB — SARS-COV-2 RNA RESP QL NAA+PROBE: NOT DETECTED

## 2021-01-28 ENCOUNTER — LAB VISIT (OUTPATIENT)
Dept: LAB | Facility: OTHER | Age: 66
End: 2021-01-28
Payer: MEDICAID

## 2021-01-28 DIAGNOSIS — Z20.822 ENCOUNTER FOR LABORATORY TESTING FOR COVID-19 VIRUS: ICD-10-CM

## 2021-01-28 PROCEDURE — U0003 INFECTIOUS AGENT DETECTION BY NUCLEIC ACID (DNA OR RNA); SEVERE ACUTE RESPIRATORY SYNDROME CORONAVIRUS 2 (SARS-COV-2) (CORONAVIRUS DISEASE [COVID-19]), AMPLIFIED PROBE TECHNIQUE, MAKING USE OF HIGH THROUGHPUT TECHNOLOGIES AS DESCRIBED BY CMS-2020-01-R: HCPCS

## 2021-01-29 LAB — SARS-COV-2 RNA RESP QL NAA+PROBE: NOT DETECTED

## 2021-02-04 ENCOUNTER — LAB VISIT (OUTPATIENT)
Dept: LAB | Facility: OTHER | Age: 66
End: 2021-02-04
Payer: MEDICAID

## 2021-02-04 DIAGNOSIS — Z20.822 ENCOUNTER FOR LABORATORY TESTING FOR COVID-19 VIRUS: ICD-10-CM

## 2021-02-04 PROCEDURE — U0003 INFECTIOUS AGENT DETECTION BY NUCLEIC ACID (DNA OR RNA); SEVERE ACUTE RESPIRATORY SYNDROME CORONAVIRUS 2 (SARS-COV-2) (CORONAVIRUS DISEASE [COVID-19]), AMPLIFIED PROBE TECHNIQUE, MAKING USE OF HIGH THROUGHPUT TECHNOLOGIES AS DESCRIBED BY CMS-2020-01-R: HCPCS

## 2021-02-05 LAB — SARS-COV-2 RNA RESP QL NAA+PROBE: NOT DETECTED

## 2021-02-11 ENCOUNTER — LAB VISIT (OUTPATIENT)
Dept: LAB | Facility: OTHER | Age: 66
End: 2021-02-11
Payer: MEDICAID

## 2021-02-11 DIAGNOSIS — Z20.822 ENCOUNTER FOR LABORATORY TESTING FOR COVID-19 VIRUS: ICD-10-CM

## 2021-02-11 PROCEDURE — U0003 INFECTIOUS AGENT DETECTION BY NUCLEIC ACID (DNA OR RNA); SEVERE ACUTE RESPIRATORY SYNDROME CORONAVIRUS 2 (SARS-COV-2) (CORONAVIRUS DISEASE [COVID-19]), AMPLIFIED PROBE TECHNIQUE, MAKING USE OF HIGH THROUGHPUT TECHNOLOGIES AS DESCRIBED BY CMS-2020-01-R: HCPCS

## 2021-02-12 LAB — SARS-COV-2 RNA RESP QL NAA+PROBE: NOT DETECTED

## 2021-07-26 ENCOUNTER — LAB VISIT (OUTPATIENT)
Dept: LAB | Facility: OTHER | Age: 66
End: 2021-07-26
Payer: MEDICAID

## 2021-07-26 DIAGNOSIS — Z20.822 ENCOUNTER FOR LABORATORY TESTING FOR COVID-19 VIRUS: ICD-10-CM

## 2021-07-26 PROCEDURE — U0003 INFECTIOUS AGENT DETECTION BY NUCLEIC ACID (DNA OR RNA); SEVERE ACUTE RESPIRATORY SYNDROME CORONAVIRUS 2 (SARS-COV-2) (CORONAVIRUS DISEASE [COVID-19]), AMPLIFIED PROBE TECHNIQUE, MAKING USE OF HIGH THROUGHPUT TECHNOLOGIES AS DESCRIBED BY CMS-2020-01-R: HCPCS | Performed by: NURSE PRACTITIONER

## 2021-07-27 LAB
SARS-COV-2 RNA RESP QL NAA+PROBE: NOT DETECTED
SARS-COV-2- CYCLE NUMBER: -1

## 2021-08-02 ENCOUNTER — LAB VISIT (OUTPATIENT)
Dept: LAB | Facility: OTHER | Age: 66
End: 2021-08-02
Payer: MEDICAID

## 2021-08-02 DIAGNOSIS — Z20.822 ENCOUNTER FOR LABORATORY TESTING FOR COVID-19 VIRUS: ICD-10-CM

## 2021-08-02 PROCEDURE — U0003 INFECTIOUS AGENT DETECTION BY NUCLEIC ACID (DNA OR RNA); SEVERE ACUTE RESPIRATORY SYNDROME CORONAVIRUS 2 (SARS-COV-2) (CORONAVIRUS DISEASE [COVID-19]), AMPLIFIED PROBE TECHNIQUE, MAKING USE OF HIGH THROUGHPUT TECHNOLOGIES AS DESCRIBED BY CMS-2020-01-R: HCPCS | Performed by: NURSE PRACTITIONER

## 2021-08-04 LAB
SARS-COV-2 RNA RESP QL NAA+PROBE: NOT DETECTED
SARS-COV-2- CYCLE NUMBER: -1

## 2021-08-09 ENCOUNTER — LAB VISIT (OUTPATIENT)
Dept: LAB | Facility: OTHER | Age: 66
End: 2021-08-09
Payer: MEDICAID

## 2021-08-09 DIAGNOSIS — Z20.822 ENCOUNTER FOR LABORATORY TESTING FOR COVID-19 VIRUS: ICD-10-CM

## 2021-08-09 PROCEDURE — U0003 INFECTIOUS AGENT DETECTION BY NUCLEIC ACID (DNA OR RNA); SEVERE ACUTE RESPIRATORY SYNDROME CORONAVIRUS 2 (SARS-COV-2) (CORONAVIRUS DISEASE [COVID-19]), AMPLIFIED PROBE TECHNIQUE, MAKING USE OF HIGH THROUGHPUT TECHNOLOGIES AS DESCRIBED BY CMS-2020-01-R: HCPCS | Performed by: NURSE PRACTITIONER

## 2021-08-12 LAB
SARS-COV-2 RNA RESP QL NAA+PROBE: NORMAL
TEST PERFORMANCE INFO SPEC: NORMAL

## 2021-08-16 ENCOUNTER — LAB VISIT (OUTPATIENT)
Dept: LAB | Facility: OTHER | Age: 66
End: 2021-08-16
Payer: MEDICAID

## 2021-08-16 DIAGNOSIS — Z20.822 ENCOUNTER FOR LABORATORY TESTING FOR COVID-19 VIRUS: ICD-10-CM

## 2021-08-16 PROCEDURE — U0003 INFECTIOUS AGENT DETECTION BY NUCLEIC ACID (DNA OR RNA); SEVERE ACUTE RESPIRATORY SYNDROME CORONAVIRUS 2 (SARS-COV-2) (CORONAVIRUS DISEASE [COVID-19]), AMPLIFIED PROBE TECHNIQUE, MAKING USE OF HIGH THROUGHPUT TECHNOLOGIES AS DESCRIBED BY CMS-2020-01-R: HCPCS | Performed by: NURSE PRACTITIONER

## 2021-08-18 LAB
SARS-COV-2 RNA RESP QL NAA+PROBE: NOT DETECTED
SARS-COV-2- CYCLE NUMBER: -1

## 2021-08-23 ENCOUNTER — LAB VISIT (OUTPATIENT)
Dept: LAB | Facility: OTHER | Age: 66
End: 2021-08-23
Payer: MEDICAID

## 2021-08-23 DIAGNOSIS — Z20.822 ENCOUNTER FOR LABORATORY TESTING FOR COVID-19 VIRUS: ICD-10-CM

## 2021-08-23 PROCEDURE — U0003 INFECTIOUS AGENT DETECTION BY NUCLEIC ACID (DNA OR RNA); SEVERE ACUTE RESPIRATORY SYNDROME CORONAVIRUS 2 (SARS-COV-2) (CORONAVIRUS DISEASE [COVID-19]), AMPLIFIED PROBE TECHNIQUE, MAKING USE OF HIGH THROUGHPUT TECHNOLOGIES AS DESCRIBED BY CMS-2020-01-R: HCPCS | Performed by: NURSE PRACTITIONER

## 2021-08-26 LAB — SARS-COV-2 RNA RESP QL NAA+PROBE: NOT DETECTED

## 2021-09-13 ENCOUNTER — LAB VISIT (OUTPATIENT)
Dept: LAB | Facility: OTHER | Age: 66
End: 2021-09-13
Payer: MEDICAID

## 2021-09-13 DIAGNOSIS — Z20.822 ENCOUNTER FOR LABORATORY TESTING FOR COVID-19 VIRUS: ICD-10-CM

## 2021-09-13 PROCEDURE — U0003 INFECTIOUS AGENT DETECTION BY NUCLEIC ACID (DNA OR RNA); SEVERE ACUTE RESPIRATORY SYNDROME CORONAVIRUS 2 (SARS-COV-2) (CORONAVIRUS DISEASE [COVID-19]), AMPLIFIED PROBE TECHNIQUE, MAKING USE OF HIGH THROUGHPUT TECHNOLOGIES AS DESCRIBED BY CMS-2020-01-R: HCPCS | Performed by: NURSE PRACTITIONER

## 2021-09-15 LAB
SARS-COV-2 RNA RESP QL NAA+PROBE: NOT DETECTED
SARS-COV-2- CYCLE NUMBER: NORMAL

## 2021-09-20 ENCOUNTER — LAB VISIT (OUTPATIENT)
Dept: LAB | Facility: OTHER | Age: 66
End: 2021-09-20
Payer: MEDICAID

## 2021-09-20 DIAGNOSIS — Z20.822 ENCOUNTER FOR LABORATORY TESTING FOR COVID-19 VIRUS: ICD-10-CM

## 2021-09-20 PROCEDURE — U0003 INFECTIOUS AGENT DETECTION BY NUCLEIC ACID (DNA OR RNA); SEVERE ACUTE RESPIRATORY SYNDROME CORONAVIRUS 2 (SARS-COV-2) (CORONAVIRUS DISEASE [COVID-19]), AMPLIFIED PROBE TECHNIQUE, MAKING USE OF HIGH THROUGHPUT TECHNOLOGIES AS DESCRIBED BY CMS-2020-01-R: HCPCS | Performed by: NURSE PRACTITIONER

## 2021-09-21 LAB
SARS-COV-2 RNA RESP QL NAA+PROBE: NOT DETECTED
SARS-COV-2- CYCLE NUMBER: NORMAL

## 2021-12-27 ENCOUNTER — LAB VISIT (OUTPATIENT)
Dept: LAB | Facility: OTHER | Age: 66
End: 2021-12-27
Payer: MEDICAID

## 2021-12-27 DIAGNOSIS — Z20.822 ENCOUNTER FOR LABORATORY TESTING FOR COVID-19 VIRUS: ICD-10-CM

## 2021-12-27 PROCEDURE — U0003 INFECTIOUS AGENT DETECTION BY NUCLEIC ACID (DNA OR RNA); SEVERE ACUTE RESPIRATORY SYNDROME CORONAVIRUS 2 (SARS-COV-2) (CORONAVIRUS DISEASE [COVID-19]), AMPLIFIED PROBE TECHNIQUE, MAKING USE OF HIGH THROUGHPUT TECHNOLOGIES AS DESCRIBED BY CMS-2020-01-R: HCPCS | Performed by: NURSE PRACTITIONER

## 2021-12-28 DIAGNOSIS — U07.1 COVID-19 VIRUS DETECTED: ICD-10-CM

## 2021-12-28 LAB
SARS-COV-2 RNA RESP QL NAA+PROBE: DETECTED
SARS-COV-2- CYCLE NUMBER: 26

## 2024-10-21 ENCOUNTER — TELEPHONE (OUTPATIENT)
Dept: ENDOSCOPY | Facility: HOSPITAL | Age: 69
End: 2024-10-21
Payer: MEDICAID

## 2024-10-21 NOTE — TELEPHONE ENCOUNTER
Patient has referral for office visit scanned in chart. Patient needs to have office visit prior to scheduling colonoscopy. Looks like patient has constipation and diarrhea issues. Not enough information sent from referring doctor.

## 2024-10-21 NOTE — TELEPHONE ENCOUNTER
----- Message from Juana sent at 10/15/2024  9:25 AM CDT -----    ----- Message -----  From: Araceli Shelby  Sent: 10/15/2024   9:16 AM CDT  To: [facility] Endoscopy Schedulers    Hello,    I have pt being referred for Colonoscopy.  I have scanned the referral /records in to media mgr within Epic. Please review and contact for scheduling,thanks!           Araceli Merlos

## 2024-10-24 ENCOUNTER — TELEPHONE (OUTPATIENT)
Dept: ENDOSCOPY | Facility: HOSPITAL | Age: 69
End: 2024-10-24
Payer: MEDICAID

## 2024-10-24 VITALS — HEIGHT: 73 IN | WEIGHT: 201.25 LBS | BODY MASS INDEX: 26.67 KG/M2

## 2024-10-24 DIAGNOSIS — Z12.11 SCREEN FOR COLON CANCER: Primary | ICD-10-CM

## 2024-10-24 DIAGNOSIS — D64.9 ANEMIA, UNSPECIFIED TYPE: Primary | ICD-10-CM

## 2024-10-24 RX ORDER — OXYBUTYNIN CHLORIDE 5 MG/1
5 TABLET, EXTENDED RELEASE ORAL
COMMUNITY
Start: 2024-09-25

## 2024-10-24 RX ORDER — AMLODIPINE BESYLATE 10 MG/1
10 TABLET ORAL
COMMUNITY
Start: 2024-10-01

## 2024-10-24 RX ORDER — FINASTERIDE 5 MG/1
5 TABLET, FILM COATED ORAL
COMMUNITY
Start: 2024-10-07

## 2024-10-24 RX ORDER — TIZANIDINE 4 MG/1
4 TABLET ORAL 2 TIMES DAILY
COMMUNITY
Start: 2024-10-07

## 2024-10-24 RX ORDER — GABAPENTIN 100 MG/1
CAPSULE ORAL 2 TIMES DAILY
COMMUNITY
Start: 2024-10-21

## 2024-10-24 RX ORDER — CLOTRIMAZOLE AND BETAMETHASONE DIPROPIONATE 10; .64 MG/G; MG/G
CREAM TOPICAL
COMMUNITY
Start: 2024-06-14

## 2024-10-24 NOTE — TELEPHONE ENCOUNTER
Are you ready for your Colonoscopy?      __ If you take blood thinners,weight loss or diabetic injectable medications, have you stopped taking them according to your doctor's instructions before your procedures?  __ Have you stopped eating solid foods and followed a clear liquid diet a full day before your procedure or followed the diet       guidelines indicated in your instructions?       REMINDER: NO BROTH AFTER MIDNIGHT THE DAY BEFORE PROCEDURE.   __ Have you completed all your prep solution? PLEASE DO NOT FOLLOW the insert/or box instructions from pharmacy)  __ Have you taken your blood pressure, heart, seizure, or other essential medications the morning of your procedure?  __ Have you planned for a ride to and from procedure?  (Medical Transportation, Uber, Lyft, Taxi, etc. may ONLY be used if a responsible adult is present to accompany you home). The responsible adult CANNOT be the  of the service.  person must be available to return and pick you up within 15 minutes of being notified of discharge.           Questions or Concerns?  Please call us!  [phone] (M-F) [phone] (Nights and weekends)    Listed below are some helpful tips:    Please bring protective cases for eyewear and hearing aids-wear comfortable clothing/shoes.  Follow prep instructions closely so you don't have to do it twice.  Bowel prep is prescription used to clean out the colon before a colonoscopy. The prep increases movement of your colon by causing you to have diarrhea (loose stools). Cleaning out stool from the colon helps your doctor to see in your colon clearly during this procedure.   It is important to stay hydrated before, during and after bowel prep to prevent loss of fluid (dehydration). You can have water and your choice of clear liquids. Reminder: these liquids you will drink in addition to the bowel prep.     Preparing the mixture:    First, mix the prep with water.  Make the taste better by adding a sugar  free drink mix (Crystal Light) can improve the taste of your prep.   Use a large bore (opening) straw. Place towards the back of mouth (throat) as tolerated.  Prepare a prep mixture that is lightly chilled, but not ice-cold. Drinking a large amount of ice-cold liquid can make you feel very ill.  Avoid drinking any RED beverages or eating popsicles with this color for the 24 hours leading up to your procedure. This color can look like blood in the colon.    Consuming the prep:    Take your time. If you feel ill, take a 15-minute break from drinking the prep mixture  Combat hunger and dehydration with clear liquids. Options like JELL-O, or popsicles will help.  Settle in with good reading material. The goal is to clean out 6 feet of colon, so you can plan on spending a good deal of time in the bathroom. Have some good reading material on-hand or an iPad ready to keep yourself entertained!  Keep yourself comfortable. We recommend applying personal hygiene wipes, Tucks pads and a soothing ointment, like A&D ointment, Desitin, or Vaseline to your bottom before starting and as needed to protect your skin.        IMPORTANT INFORMATION TO KNOW BEFORE YOUR PROCEDURE    [facility] [place] 4th Floor         If your procedure requires the administration of anesthesia, it is necessary for a responsible adult to drive you home. (Medical Transportation, Uber, Lyft, Taxi, etc. may ONLY be used if a responsible adult is present to accompany you home.  The responsible adult CAN'T be the  of the service).      person must be available to return to pick you up within 15 minutes of being notified of discharge.       Please bring a picture ID, insurance card, & copayment      Take Medications as directed below:    If you begin taking any blood thinning medications, injectable weight loss/diabetes medications (other than insulin) , or Adipex (Phentermine) please contact the endoscopy scheduling department  listed below as soon as possible.    If you are diabetic see the attached instruction sheet regarding your medication.     If you take HEART, BLOOD PRESSURE, SEIZURE, PAIN, LUNG (including inhalers/nebulizers), ANTI-REJECTION (transplant patients), or PSYCHIATRIC medications, please take at your regular times with a sip of water or as directed by the scheduling nurse.     Important contact information:    Endoscopy [phone] Hours of operation Monday-Friday 8:00-4:30pm.    Questions about insurance or financial obligations call [phone] or [phone].    If you have questions regarding the prep or need to reschedule, please call [phone]. After hours questions requiring immediate assistance, contact [facility] nurse line at [phone] or [phone].   NOTE:     On occasion, unforeseen circumstances may cause a delay in your procedure start time. We respect your time and appreciate your patience during these circumstances.      Comments:              PEG (polyethylene glycol) Instructions for Colonoscopy   Common Brands: Golytely, Colyte, Nulytely, Gavilyte, Trilyte      Date of procedure: 12/2/24  Arrive at: 11:30 AM    Location of Department:   [facility] [address] [place] [address]  Take the [facility] Elevators to 4th Floor Endoscopy Lab    As soon as possible:   your prep from pharmacy and over the counter DULCOLAX LAXATIVE TABLETS               What You CAN do:   You may have clear liquids ONLY -see below for list.     What You CANNOT do:   Do not EAT solid food, drink milk or anything colored red.  Do not drink alcohol.  Do not take oral medications within 1 hour of starting   each dose of prep.  No gum chewing or candy morning of procedure    Liquids That Are OK to Drink:   Water  Sports drinks (Gatorade, Power-Aid)  Coffee or tea (no cream or nondairy creamer)  Clear juices without pulp (apple, white grape)  Gelatin desserts (no fruit  or toppings)  Clear soda (sprite, coke, ginger ale)  Chicken broth (until 12 midnight the night before procedure)                                Note:     (Please disregard the insert instructions from pharmacy).  PEG Bowel Prep is indicated for cleansing of the colon as a preparation for colonoscopy in adults.   Be sure to tell your doctor about all the medicines you take, including prescription and non-prescription medicines, vitamins, and herbal supplements. PEG Bowel Prep may affect how other medicines work.  Medication taken by mouth may not be absorbed properly when taken within 1 hour before the start of each dose of PEG Bowel Prep.      It is not uncommon to experience some abdominal cramping, nausea and/or vomiting when taking the prep. If you have nausea and/or vomiting while taking the prep, stop drinking for 20 to 30 minutes then continue.      How to take prep:    PEG Bowel Prep-Two (2) gallon containers.    Two (2) containers of prep are required for a complete preparation for colonoscopy. Dilute the solution concentrate as directed prior to use. You must drink water with each dose of prep, and additional water after each dose.    IMPORTANT: If you experience preparation-related symptoms (for example, nausea, bloating, or cramping), stop or slow the rate of drinking until your symptoms decrease.        DOSE 1--2 Days Before Colonoscopy 11/30/24     Drink at least 6 to 8 glasses of clear liquids from time you wake up until you begin your prep and then continue until bedtime to avoid dehydration.     You may have clear liquids ONLY.    Step 1-In the morning-Mix your entire container of prep with lukewarm water and refrigerate.      Step 2-12:00 pm (NOON) Take four (4) Dulcolax (Bisacodyl) tablets with at least 8 ounces or more of clear liquids.    Step 3-6:00 pm- Drink one 8 oz. glass of the prep every 10-15 minutes until the mixture is gone.   Set a timer as a reminder.        DOSE 2--Day Before  Colonoscopy 12/1/24     Drink at least 6 to 8 glasses of clear liquids from time you wake up until you begin your prep and then continue until bedtime to avoid dehydration.     You may have clear liquids ONLY.    Step 1-In the morning- Mix your entire container of prep with lukewarm water and refrigerate.             Step 2-6:00 pm- Drink half the liquid in the container within one (1) hour. Refrigerate the remaining half of the prep for dose 3. See below when to begin this step.       DOSE 3--Day of the Colonoscopy 12/2/24  at 2-3 AM.      Step 1-Drink the remaining half of the prep within 1 hour.  Step 2- You may continue drinking water/clear liquids until        2  hours before your colonoscopy or as directed by the scheduling nurse 10:30 AM.      For more information about your procedure, please watch this informational video. It is important to watch this animated consent video prior to your arrival.   If you haven't watched the video prior to arriving, you are required to watch it during admission which can cause delays.     Options for viewing:  Using a keyboard:  press and hold the control tab (Ctrl) and left mouse click to follow link                  For information about your procedure, two (2) things to view prior to colonoscopy:  Please watch this informational video. It is important to watch this animated consent video prior to your arrival. If you haven't watched the video prior to arriving, you are required to watch it during admission which can causes delays.    Options for viewing:   Using a keyboard:  press and hold the control tab (Ctrl) and left mouse click to follow links.           Colonoscopy Instructional Video                                                                                   OR    Type link address into your web browser's address bar:  [email or web address].com/[email or web address]?v=[email or web address]      Educational Booklet with pictures:      Colonoscopy Prep - Liquid                                                               OR    Type link address into your web browser's address bar:  [email or web address]    Using a mobile phone: tap on web address/link.       Comments:

## 2024-10-24 NOTE — TELEPHONE ENCOUNTER
Referral for procedure from outside referral      Spoke to Ester Cummins at [facility] to schedule procedure(s) Colonoscopy       Physician to perform procedure(s) Dr. BEAU Robertson  Date of Procedure (s) 12/2/24  Arrival Time 11:30 AM  Time of Procedure(s) 12:30 PM   Location of Procedure(s) [place] [address] Floor  Type of Rx Prep sent to patient: PEG  Instructions provided to patient via Postal Mail    Patient was informed on the following information and verbalized understanding. Screening questionnaire reviewed with patient and complete. If procedure requires anesthesia, a responsible adult needs to be present to accompany the patient home, patient cannot drive after receiving anesthesia. Appointment details are tentative, especially check-in time. Patient will receive a prep-op call 7 days prior to confirm check-in time for procedure. If applicable the patient should contact their pharmacy to verify Rx for procedure prep is ready for pick-up. Patient was advised to call the scheduling department at [phone] if pharmacy states no Rx is available. Patient was advised to call the endoscopy scheduling department if any questions or concerns arise.      [facility] Scheduling Department

## 2024-10-24 NOTE — TELEPHONE ENCOUNTER
----- Message from Juana sent at 10/8/2024  8:29 AM CDT -----    ----- Message -----  From: Araceli Shelby  Sent: 10/7/2024  12:11 PM CDT  To: [facility] Endoscopy Schedulers    Hello,    I have pt being referred for Colonoscopy.  I have scanned the referral /records in to media mgr within Epic. Please review and contact for scheduling,thanks!           Araceli Merlos

## 2024-12-03 ENCOUNTER — TELEPHONE (OUTPATIENT)
Dept: ENDOSCOPY | Facility: HOSPITAL | Age: 69
End: 2024-12-03
Payer: MEDICAID

## 2024-12-03 NOTE — TELEPHONE ENCOUNTER
Called and spoke to Nurse Norman from [facility] to reschedule patient's colonoscopy due to poor prep. She states he was pouring his bowel prep straight into the urinal and he refuses to get rescheduled for a colonoscopy.